# Patient Record
Sex: FEMALE | Race: WHITE | NOT HISPANIC OR LATINO | Employment: FULL TIME | URBAN - METROPOLITAN AREA
[De-identification: names, ages, dates, MRNs, and addresses within clinical notes are randomized per-mention and may not be internally consistent; named-entity substitution may affect disease eponyms.]

---

## 2015-09-16 LAB — Lab: NEGATIVE

## 2018-03-29 ENCOUNTER — OFFICE VISIT (OUTPATIENT)
Dept: OBSTETRICS AND GYNECOLOGY | Facility: CLINIC | Age: 38
End: 2018-03-29
Payer: COMMERCIAL

## 2018-03-29 VITALS — SYSTOLIC BLOOD PRESSURE: 114 MMHG | DIASTOLIC BLOOD PRESSURE: 72 MMHG | WEIGHT: 117 LBS

## 2018-03-29 DIAGNOSIS — O09.522 ADVANCED MATERNAL AGE IN MULTIGRAVIDA, SECOND TRIMESTER: ICD-10-CM

## 2018-03-29 DIAGNOSIS — N91.2 AMENORRHEA: Primary | ICD-10-CM

## 2018-03-29 DIAGNOSIS — Z34.02 ENCOUNTER FOR SUPERVISION OF NORMAL FIRST PREGNANCY IN SECOND TRIMESTER: ICD-10-CM

## 2018-03-29 DIAGNOSIS — Z31.430 ENCOUNTER OF FEMALE FOR TESTING FOR GENETIC DISEASE CARRIER STATUS FOR PROCREATIVE MANAGEMENT: ICD-10-CM

## 2018-03-29 DIAGNOSIS — Z3A.09 9 WEEKS GESTATION OF PREGNANCY: ICD-10-CM

## 2018-03-29 LAB
AMB NIPT OB CONSOLE ONLY: NEGATIVE
BILIRUBIN, POC: NEGATIVE
BLOOD URINE, POC: NEGATIVE
CLARITY, POC: CLEAR
COLOR, POC: YELLOW
FRAGILE X OB CONSOLE: NEGATIVE
GLUCOSE URINE, POC: NEGATIVE
KETONES, POC: NEGATIVE
LEUKOCYTE EST, POC: NEGATIVE
NITRITE, POC: NEGATIVE
PROTEIN, POC: NEGATIVE
SMA OB CONSOLE: NORMAL

## 2018-03-29 PROCEDURE — 81002 URINALYSIS NONAUTO W/O SCOPE: CPT | Performed by: OBSTETRICS & GYNECOLOGY

## 2018-03-29 PROCEDURE — 76815 OB US LIMITED FETUS(S): CPT | Performed by: OBSTETRICS & GYNECOLOGY

## 2018-03-29 PROCEDURE — 99214 OFFICE O/P EST MOD 30 MIN: CPT | Mod: 25 | Performed by: OBSTETRICS & GYNECOLOGY

## 2018-03-29 RX ORDER — ENOXAPARIN SODIUM 100 MG/ML
30 INJECTION SUBCUTANEOUS
Status: ON HOLD | COMMUNITY
End: 2018-10-02

## 2018-03-29 RX ORDER — ESTRADIOL 0.5 MG/1
0.5 TABLET ORAL DAILY
COMMUNITY
End: 2018-09-17 | Stop reason: ALTCHOICE

## 2018-03-29 RX ORDER — PROGESTERONE 100 MG/1
100 CAPSULE ORAL DAILY
COMMUNITY
End: 2018-09-17 | Stop reason: ALTCHOICE

## 2018-03-30 LAB
B19V IGG SER IA-ACNC: 7.2 INDEX (ref 0–0.8)
B19V IGM SER IA-ACNC: 0.1 INDEX (ref 0–0.8)
LAB CORP COMMENT:: NORMAL
T GONDII IGM SER IA-ACNC: <3 AU/ML (ref 0–7.9)

## 2018-04-02 LAB
HGB A MFR BLD: 98.4 % (ref 96.4–98.8)
HGB A2 MFR BLD COLUMN CHROM: 1.6 % (ref 1.8–3.2)
HGB C MFR BLD: 0 %
HGB F MFR BLD: 0 % (ref 0–2)
HGB FRACT BLD-IMP: (no result)
HGB S BLD QL SOLY: NEGATIVE
HGB S MFR BLD: 0 %

## 2018-04-03 NOTE — PROGRESS NOTES
Tatum Correa is a  37 y.o.  with amenorrhea and subjective feelings of pregnancy       Patient's last menstrual period was 2017.  and is currently 10weeks 4 days gestation with  EDC 18    Occupation:    : ger    Medical History/GYN History No past medical history on file.    Surgical History: No past surgical history on file.    Medicines:  Current Outpatient Prescriptions:   •  enoxaparin (LOVENOX) 30 mg/0.3 mL syringe, Inject 30 mg under the skin every 12 (twelve) hours., Disp: , Rfl:   •  estradiol (ESTRACE) 0.5 mg tablet, Take 0.5 mg by mouth daily., Disp: , Rfl:   •  progesterone (PROMETRIUM) 100 mg capsule, Take 100 mg by mouth daily., Disp: , Rfl:     Allergies: Patient has no allergy information on record.  pmh-none  Past sx hx ivf  Last exam: <1 year    Family History for Thrombosis:none    Smoker:none    Cervical Procedure:none    Herpes History:none    OB History:   Obstetric History       T0      L0     SAB0   TAB0   Ectopic0   Multiple0   Live Births0       # Outcome Date GA Lbr Clifton/2nd Weight Sex Delivery Anes PTL Lv   1 Current                 Fertility hx-   2016 laura 40 week delivery 5 lbs 13 oz vaginal  Present pregnancy ivf dr peters 4 cylces on metformain predisone estradiol progesterone nupagen and lovanox    Family History: No family history on file.      Review of Systems  Fatigue neg  Weight Change neg  Cough neg  Chest Pain neg  SOB neg  Abd pain neg  Breast problems neg  Abnormal bleeding neg  Vaginal D/C neg  Pelvic Pain neg  All other systems neg      Assessment: 37 y.o.  with Amenorrhea at 6 weeks  and IUP with Estimated Date of Delivery:10/26/2018        1) Pregnancy:   Pap 17 negative pap negative probe  PNL, urine culture, TFTs, PE, Hgb electophoresis  Aneuploidy screening and testing options reviewed and patient referred to PTC for testing and counseling.    Exercise and Nutrition counseling in pregnancy, safe foods, weight  gain and nutrition reviewed  Routine prenatal care and counseling provided  Major Morbidity and Mortality of age group d/w pt  Safe medications in pregnancy information given  Travel and Zika precautions reviewed  Safety of hair dye, manicure/pedicure and massage reviewed  Prenatal Vitamins with DHA  OB reg at next visit

## 2018-04-04 LAB
# FETUSES US: 1
CYTOGENETICS STUDY: NORMAL
FMR1 GENE CGG RPT BLD/T QL: NORMAL
FMR1 GENE MUT ANL BLD/T: NORMAL
GA: 10 WEEKS
GENETIC ALGORITHM SENSITIVITY: NORMAL %
LAB CORP CHROMOSOME 13 INTERPRETATION: NORMAL
LAB CORP CHROMOSOME 13 RESULT: NORMAL
LAB CORP CHROMOSOME 18 INTERPRETATION: NORMAL
LAB CORP CHROMOSOME 18 RESULT: NORMAL
LAB CORP CHROMOSOME 21 INTERPRETATION: NORMAL
LAB CORP CHROMOSOME 21 RESULT: NORMAL
LAB CORP COMMENT:: NORMAL
LAB CORP DISCLAIMER: NORMAL
LAB CORP FETAL FRACTION (%):: 10
LAB CORP INDICATION FOR TESTING: NORMAL
LAB CORP REFERENCES: NORMAL
LAB CORP SCREEN RESULT: NORMAL
LAB DIRECTOR NAME PROVIDER: NORMAL
REF LAB TEST METHOD: NORMAL
SERVICE CMNT 02-IMP: NORMAL
SERVICE CMNT-IMP: NORMAL

## 2018-04-10 LAB
ANNOTATION COMMENT IMP: NORMAL
ANNOTATION COMMENT IMP: NORMAL
ETHNIC BACKGROUND STATED: NORMAL
GEN KNWLDGE REF ID: NORMAL
LAB CORP CARRIER DETECTION RATE:: NORMAL
LAB CORP CLINICAL DATA:: NORMAL
LAB CORP ELECTRONICALLY SIGNED BY:: NORMAL
LAB CORP GENETIC COUNSELOR:: NORMAL
REF LAB TEST METHOD: NORMAL
SMN1 GENE MUT ANL BLD/T: NORMAL
SMN1 GENE MUT ANL BLD/T: NORMAL
SPEC CONTAINER SPEC: NORMAL
SPECIMEN SOURCE: NORMAL
TEST PERFORMANCE INFO SPEC: NORMAL

## 2018-04-16 ENCOUNTER — OFFICE VISIT (OUTPATIENT)
Dept: OBSTETRICS AND GYNECOLOGY | Facility: CLINIC | Age: 38
End: 2018-04-16
Payer: COMMERCIAL

## 2018-04-16 VITALS — SYSTOLIC BLOOD PRESSURE: 102 MMHG | WEIGHT: 121 LBS | DIASTOLIC BLOOD PRESSURE: 50 MMHG

## 2018-04-16 DIAGNOSIS — Z3A.12 12 WEEKS GESTATION OF PREGNANCY: Primary | ICD-10-CM

## 2018-04-16 LAB
BLOOD URINE, POC: NEGATIVE
GLUCOSE URINE, POC: NEGATIVE
PROTEIN, POC: NEGATIVE

## 2018-04-16 PROCEDURE — 76801 OB US < 14 WKS SINGLE FETUS: CPT | Performed by: OBSTETRICS & GYNECOLOGY

## 2018-04-16 PROCEDURE — 0500F INITIAL PRENATAL CARE VISIT: CPT | Performed by: OBSTETRICS & GYNECOLOGY

## 2018-04-16 PROCEDURE — 81002 URINALYSIS NONAUTO W/O SCOPE: CPT | Performed by: OBSTETRICS & GYNECOLOGY

## 2018-04-16 PROCEDURE — 200200 PR NO CHARGE: Performed by: OBSTETRICS & GYNECOLOGY

## 2018-04-16 RX ORDER — FOLIC ACID 1 MG/1
1 TABLET ORAL DAILY
COMMUNITY
End: 2018-10-27 | Stop reason: HOSPADM

## 2018-04-16 NOTE — PRENATAL NOTE
Nuchal fold <3 mm    Fetal limb development good  Fetal length 6 cm  msafp next visit     Off all hormones  ivf referral dr peters    Occupation:    : ger  Medicines: pnv  Allergies: Patient has no allergy information on record.  pmh-none  Past sx hx ivf  Last exam: <1 year  Family History for Thrombosis:none  Smoker:none  Cervical Procedure:none  Herpes History:none  OB History:   Fertility hx-   2016 laura 40 week delivery 5 lbs 13 oz vaginal  Present pregnancy ivf dr peters 4 cylces on metformain predisone estradiol progesterone nupagen and lovanox

## 2018-05-03 ENCOUNTER — OFFICE VISIT (OUTPATIENT)
Dept: OBSTETRICS AND GYNECOLOGY | Facility: CLINIC | Age: 38
End: 2018-05-03
Payer: COMMERCIAL

## 2018-05-03 VITALS — SYSTOLIC BLOOD PRESSURE: 100 MMHG | WEIGHT: 120 LBS | DIASTOLIC BLOOD PRESSURE: 68 MMHG

## 2018-05-03 DIAGNOSIS — Z3A.14 14 WEEKS GESTATION OF PREGNANCY: Primary | ICD-10-CM

## 2018-05-03 DIAGNOSIS — O35.9XX0 SUSPECTED FETAL ANOMALY, ANTEPARTUM, SINGLE OR UNSPECIFIED FETUS: ICD-10-CM

## 2018-05-03 DIAGNOSIS — Z36.0 ANTENATAL SCREENING FOR CHROMOSOMAL ANOMALIES BY AMNIOCENTESIS: ICD-10-CM

## 2018-05-03 DIAGNOSIS — O09.522 ELDERLY MULTIGRAVIDA IN SECOND TRIMESTER: ICD-10-CM

## 2018-05-03 LAB
BILIRUBIN, POC: NEGATIVE
BLOOD URINE, POC: NEGATIVE
CLARITY, POC: CLEAR
COLOR, POC: YELLOW
GLUCOSE URINE, POC: NEGATIVE
KETONES, POC: NEGATIVE
LEUKOCYTE EST, POC: NEGATIVE
NITRITE, POC: NEGATIVE
PROTEIN, POC: NEGATIVE

## 2018-05-03 PROCEDURE — 76801 OB US < 14 WKS SINGLE FETUS: CPT | Performed by: OBSTETRICS & GYNECOLOGY

## 2018-05-03 PROCEDURE — 200200 PR NO CHARGE: Performed by: OBSTETRICS & GYNECOLOGY

## 2018-05-03 PROCEDURE — 81002 URINALYSIS NONAUTO W/O SCOPE: CPT | Performed by: OBSTETRICS & GYNECOLOGY

## 2018-05-03 PROCEDURE — 0502F SUBSEQUENT PRENATAL CARE: CPT | Performed by: OBSTETRICS & GYNECOLOGY

## 2018-05-03 NOTE — PRENATAL NOTE
PROBLEM LIST-  WITH PRIOR PREGNANCY PT HAD PREECLAMPSIA DX AT 40 WEEKS, ATTEMPTED INDUCTION FAILED, BROUGHT BACK HAD A SUCCESSFUL  PLAN START BABY ASA  BABY HAD A SMALL FOR GESTATIONAL AGE LESS THEN 10TH PERCENTILE,PLAN WILL NEED SCAN AT 32 36 WEEKS  LOW PLATELETS PLAN- CHECK AT 28 WEEKS AND 34 WEEKS   IVF DR FONTENOT

## 2018-05-09 LAB
ABO GROUP BLD: NORMAL
BLD GP AB SCN SERPL QL: NEGATIVE
ERYTHROCYTE [DISTWIDTH] IN BLOOD BY AUTOMATED COUNT: 12.6 % (ref 12.3–15.4)
HBV SURFACE AG SERPL QL IA: NEGATIVE
HCT VFR BLD AUTO: 38.6 % (ref 34–46.6)
HCV AB S/CO SERPL IA: <0.1 S/CO RATIO (ref 0–0.9)
HGB BLD-MCNC: 13.3 G/DL (ref 11.1–15.9)
HIV 1+2 AB+HIV1 P24 AG SERPL QL IA: NON REACTIVE
HIV1 AB SERPLBLD QL IA.RAPID: NEGATIVE
LAB CORP FINAL INTERPRETATION: NEGATIVE
LAB CORP HIV 2 AB: NEGATIVE
MCH RBC QN AUTO: 32.8 PG (ref 26.6–33)
MCHC RBC AUTO-ENTMCNC: 34.5 G/DL (ref 31.5–35.7)
MCV RBC AUTO: 95 FL (ref 79–97)
PLATELET # BLD AUTO: 196 X10E3/UL (ref 150–379)
RBC # BLD AUTO: 4.06 X10E6/UL (ref 3.77–5.28)
RH BLD: POSITIVE
RPR SER QL: NON REACTIVE
RUBV IGG SERPL IA-ACNC: 2.25 INDEX
VZV IGG SER IA-ACNC: 1030 INDEX
WBC # BLD AUTO: 10.6 X10E3/UL (ref 3.4–10.8)

## 2018-05-10 LAB
HGB A MFR BLD: 98.4 % (ref 96.4–98.8)
HGB A2 MFR BLD COLUMN CHROM: 1.6 % (ref 1.8–3.2)
HGB C MFR BLD: 0 %
HGB F MFR BLD: 0 % (ref 0–2)
HGB FRACT BLD-IMP: (no result)
HGB OTHER MFR BLD HPLC: 0 %
HGB S BLD QL SOLY: NEGATIVE
HGB S MFR BLD: 0 %

## 2018-05-11 ENCOUNTER — TELEPHONE (OUTPATIENT)
Dept: OBSTETRICS AND GYNECOLOGY | Facility: CLINIC | Age: 38
End: 2018-05-11

## 2018-05-11 LAB
AFP ADJ MOM SERPL: 0.39
AFP INTERP SERPL-IMP: NORMAL
AFP INTERP SERPL-IMP: NORMAL
AFP SERPL-MCNC: 27.3 NG/ML
AGE AT DELIVERY: 38.1 YR
GA METHOD: NORMAL
GA: 20.6 WEEKS
IDDM PATIENT QL: NO
LAB CORP COMMENT:: NORMAL
LAB CORP RESULTS: NORMAL
MULTIPLE PREGNANCY: NO
NEURAL TUBE DEFECT RISK FETUS: NORMAL %

## 2018-05-21 ENCOUNTER — OFFICE VISIT (OUTPATIENT)
Dept: OBSTETRICS AND GYNECOLOGY | Facility: CLINIC | Age: 38
End: 2018-05-21
Payer: COMMERCIAL

## 2018-05-21 VITALS — DIASTOLIC BLOOD PRESSURE: 60 MMHG | SYSTOLIC BLOOD PRESSURE: 104 MMHG | WEIGHT: 124 LBS

## 2018-05-21 DIAGNOSIS — Z3A.17 17 WEEKS GESTATION OF PREGNANCY: Primary | ICD-10-CM

## 2018-05-21 PROCEDURE — 81002 URINALYSIS NONAUTO W/O SCOPE: CPT | Performed by: OBSTETRICS & GYNECOLOGY

## 2018-05-21 PROCEDURE — 0502F SUBSEQUENT PRENATAL CARE: CPT | Performed by: OBSTETRICS & GYNECOLOGY

## 2018-06-12 ENCOUNTER — HOSPITAL ENCOUNTER (OUTPATIENT)
Dept: PERINATAL CARE | Facility: HOSPITAL | Age: 38
Discharge: HOME | End: 2018-06-12
Attending: OBSTETRICS & GYNECOLOGY
Payer: COMMERCIAL

## 2018-06-12 DIAGNOSIS — O35.9XX0 SUSPECTED FETAL ANOMALY, ANTEPARTUM, SINGLE OR UNSPECIFIED FETUS: ICD-10-CM

## 2018-06-12 DIAGNOSIS — O09.02 SUPERVISION OF PREGNANCY WITH HISTORY OF INFERTILITY IN SECOND TRIMESTER: Primary | ICD-10-CM

## 2018-06-12 DIAGNOSIS — Z3A.20 20 WEEKS GESTATION OF PREGNANCY: ICD-10-CM

## 2018-06-12 DIAGNOSIS — O09.522 ELDERLY MULTIGRAVIDA IN SECOND TRIMESTER: ICD-10-CM

## 2018-06-12 DIAGNOSIS — Z36.3 ANTENATAL SCREENING FOR MALFORMATION USING ULTRASONICS: ICD-10-CM

## 2018-06-12 PROCEDURE — 76811 OB US DETAILED SNGL FETUS: CPT

## 2018-06-18 ENCOUNTER — OFFICE VISIT (OUTPATIENT)
Dept: OBSTETRICS AND GYNECOLOGY | Facility: CLINIC | Age: 38
End: 2018-06-18
Payer: COMMERCIAL

## 2018-06-18 VITALS — SYSTOLIC BLOOD PRESSURE: 100 MMHG | WEIGHT: 127 LBS | DIASTOLIC BLOOD PRESSURE: 70 MMHG

## 2018-06-18 DIAGNOSIS — Z3A.21 21 WEEKS GESTATION OF PREGNANCY: Primary | ICD-10-CM

## 2018-06-18 PROCEDURE — 0502F SUBSEQUENT PRENATAL CARE: CPT | Performed by: OBSTETRICS & GYNECOLOGY

## 2018-06-18 PROCEDURE — 81002 URINALYSIS NONAUTO W/O SCOPE: CPT | Performed by: OBSTETRICS & GYNECOLOGY

## 2018-07-11 ENCOUNTER — OFFICE VISIT (OUTPATIENT)
Dept: OBSTETRICS AND GYNECOLOGY | Facility: CLINIC | Age: 38
End: 2018-07-11
Payer: COMMERCIAL

## 2018-07-11 VITALS — SYSTOLIC BLOOD PRESSURE: 100 MMHG | DIASTOLIC BLOOD PRESSURE: 76 MMHG | WEIGHT: 129 LBS

## 2018-07-11 DIAGNOSIS — Z3A.24 24 WEEKS GESTATION OF PREGNANCY: Primary | ICD-10-CM

## 2018-07-11 LAB
BLOOD URINE, POC: NEGATIVE
CLARITY, POC: CLEAR
COLOR, POC: YELLOW
GLUCOSE URINE, POC: NEGATIVE
PROTEIN, POC: NEGATIVE

## 2018-07-11 PROCEDURE — 0502F SUBSEQUENT PRENATAL CARE: CPT | Performed by: OBSTETRICS & GYNECOLOGY

## 2018-07-11 PROCEDURE — 81002 URINALYSIS NONAUTO W/O SCOPE: CPT | Performed by: OBSTETRICS & GYNECOLOGY

## 2018-07-20 ENCOUNTER — TELEPHONE (OUTPATIENT)
Dept: OBSTETRICS AND GYNECOLOGY | Facility: CLINIC | Age: 38
End: 2018-07-20

## 2018-07-20 NOTE — TELEPHONE ENCOUNTER
"PATIENT CALLED AND WOULD LIKE TO SPEAK WITH DR IZAGUIRRE REGARDING A QUESTION SHE HAS. THERE IS AN AREA IN WHICH SHE WOULD LIKE TO TRAVEL THAT HAS BEEN \"FLAGGED\" WITH THE ZIKA VIRUS, BUT THERE ARE NO KNOWN CASES. SHE IS 26 WEEKS PREGNANT. THANKS.   "

## 2018-07-26 ENCOUNTER — OFFICE VISIT (OUTPATIENT)
Dept: OBSTETRICS AND GYNECOLOGY | Facility: CLINIC | Age: 38
End: 2018-07-26
Payer: COMMERCIAL

## 2018-07-26 VITALS — SYSTOLIC BLOOD PRESSURE: 98 MMHG | WEIGHT: 130 LBS | DIASTOLIC BLOOD PRESSURE: 68 MMHG

## 2018-07-26 DIAGNOSIS — Z3A.26 26 WEEKS GESTATION OF PREGNANCY: Primary | ICD-10-CM

## 2018-07-26 PROCEDURE — 90471 IMMUNIZATION ADMIN: CPT | Performed by: OBSTETRICS & GYNECOLOGY

## 2018-07-26 PROCEDURE — 90715 TDAP VACCINE 7 YRS/> IM: CPT | Performed by: OBSTETRICS & GYNECOLOGY

## 2018-07-26 PROCEDURE — 76815 OB US LIMITED FETUS(S): CPT | Performed by: OBSTETRICS & GYNECOLOGY

## 2018-07-26 PROCEDURE — 0502F SUBSEQUENT PRENATAL CARE: CPT | Performed by: OBSTETRICS & GYNECOLOGY

## 2018-07-26 PROCEDURE — 36415 COLL VENOUS BLD VENIPUNCTURE: CPT | Performed by: OBSTETRICS & GYNECOLOGY

## 2018-07-26 PROCEDURE — 81002 URINALYSIS NONAUTO W/O SCOPE: CPT | Performed by: OBSTETRICS & GYNECOLOGY

## 2018-07-27 LAB
BASOPHILS # BLD AUTO: 0 X10E3/UL (ref 0–0.2)
BASOPHILS NFR BLD AUTO: 0 %
EOSINOPHIL # BLD AUTO: 0.1 X10E3/UL (ref 0–0.4)
EOSINOPHIL NFR BLD AUTO: 1 %
ERYTHROCYTE [DISTWIDTH] IN BLOOD BY AUTOMATED COUNT: 13.6 % (ref 12.3–15.4)
GLUCOSE 1H P 50 G GLC PO SERPL-MCNC: 127 MG/DL (ref 65–139)
HCT VFR BLD AUTO: 38.2 % (ref 34–46.6)
HGB BLD-MCNC: 12.9 G/DL (ref 11.1–15.9)
IMM GRANULOCYTES # BLD: 0 X10E3/UL (ref 0–0.1)
IMM GRANULOCYTES NFR BLD: 0 %
LYMPHOCYTES # BLD AUTO: 2.1 X10E3/UL (ref 0.7–3.1)
LYMPHOCYTES NFR BLD AUTO: 20 %
MCH RBC QN AUTO: 32 PG (ref 26.6–33)
MCHC RBC AUTO-ENTMCNC: 33.8 G/DL (ref 31.5–35.7)
MCV RBC AUTO: 95 FL (ref 79–97)
MONOCYTES # BLD AUTO: 0.2 X10E3/UL (ref 0.1–0.9)
MONOCYTES NFR BLD AUTO: 2 %
NEUTROPHILS # BLD AUTO: 7.8 X10E3/UL (ref 1.4–7)
NEUTROPHILS NFR BLD AUTO: 77 %
PLATELET # BLD AUTO: 139 X10E3/UL (ref 150–379)
RBC # BLD AUTO: 4.03 X10E6/UL (ref 3.77–5.28)
WBC # BLD AUTO: 10.2 X10E3/UL (ref 3.4–10.8)

## 2018-08-16 ENCOUNTER — OFFICE VISIT (OUTPATIENT)
Dept: OBSTETRICS AND GYNECOLOGY | Facility: CLINIC | Age: 38
End: 2018-08-16
Payer: COMMERCIAL

## 2018-08-16 VITALS — SYSTOLIC BLOOD PRESSURE: 92 MMHG | WEIGHT: 129 LBS | DIASTOLIC BLOOD PRESSURE: 54 MMHG

## 2018-08-16 DIAGNOSIS — Z3A.29 29 WEEKS GESTATION OF PREGNANCY: Primary | ICD-10-CM

## 2018-08-16 PROCEDURE — 0502F SUBSEQUENT PRENATAL CARE: CPT | Performed by: OBSTETRICS & GYNECOLOGY

## 2018-08-16 PROCEDURE — 81002 URINALYSIS NONAUTO W/O SCOPE: CPT | Performed by: OBSTETRICS & GYNECOLOGY

## 2018-08-16 NOTE — PRENATAL NOTE
preesure check cervix  Discharge  15 lbs total encourage calories  32 36 PLT IN June 196  CHECK PLATLETS 32 WEEKS 36 WEEKS THEN WEEKLY

## 2018-08-30 ENCOUNTER — OFFICE VISIT (OUTPATIENT)
Dept: OBSTETRICS AND GYNECOLOGY | Facility: CLINIC | Age: 38
End: 2018-08-30
Payer: COMMERCIAL

## 2018-08-30 VITALS — DIASTOLIC BLOOD PRESSURE: 62 MMHG | WEIGHT: 131 LBS | SYSTOLIC BLOOD PRESSURE: 98 MMHG

## 2018-08-30 DIAGNOSIS — Z23 NEEDS FLU SHOT: ICD-10-CM

## 2018-08-30 DIAGNOSIS — O14.23 HEMOLYSIS, ELEVATED LIVER ENZYMES, AND LOW PLATELET (HELLP) SYNDROME DURING PREGNANCY IN THIRD TRIMESTER: ICD-10-CM

## 2018-08-30 DIAGNOSIS — Z3A.31 31 WEEKS GESTATION OF PREGNANCY: Primary | ICD-10-CM

## 2018-08-30 PROCEDURE — 90471 IMMUNIZATION ADMIN: CPT | Performed by: OBSTETRICS & GYNECOLOGY

## 2018-08-30 PROCEDURE — 0502F SUBSEQUENT PRENATAL CARE: CPT | Performed by: OBSTETRICS & GYNECOLOGY

## 2018-08-30 PROCEDURE — 90686 IIV4 VACC NO PRSV 0.5 ML IM: CPT | Performed by: OBSTETRICS & GYNECOLOGY

## 2018-08-30 PROCEDURE — 76815 OB US LIMITED FETUS(S): CPT | Performed by: OBSTETRICS & GYNECOLOGY

## 2018-08-30 PROCEDURE — 81002 URINALYSIS NONAUTO W/O SCOPE: CPT | Performed by: OBSTETRICS & GYNECOLOGY

## 2018-08-31 ENCOUNTER — TELEPHONE (OUTPATIENT)
Dept: OBSTETRICS AND GYNECOLOGY | Facility: CLINIC | Age: 38
End: 2018-08-31

## 2018-08-31 LAB
BASOPHILS # BLD AUTO: 0 X10E3/UL (ref 0–0.2)
BASOPHILS NFR BLD AUTO: 0 %
EOSINOPHIL # BLD AUTO: 0.1 X10E3/UL (ref 0–0.4)
EOSINOPHIL NFR BLD AUTO: 1 %
ERYTHROCYTE [DISTWIDTH] IN BLOOD BY AUTOMATED COUNT: 13.5 % (ref 12.3–15.4)
HCT VFR BLD AUTO: 37.8 % (ref 34–46.6)
HGB BLD-MCNC: 12.8 G/DL (ref 11.1–15.9)
IMM GRANULOCYTES # BLD: 0 X10E3/UL (ref 0–0.1)
IMM GRANULOCYTES NFR BLD: 0 %
LYMPHOCYTES # BLD AUTO: 1.7 X10E3/UL (ref 0.7–3.1)
LYMPHOCYTES NFR BLD AUTO: 18 %
MCH RBC QN AUTO: 31.7 PG (ref 26.6–33)
MCHC RBC AUTO-ENTMCNC: 33.9 G/DL (ref 31.5–35.7)
MCV RBC AUTO: 94 FL (ref 79–97)
MONOCYTES # BLD AUTO: 0.4 X10E3/UL (ref 0.1–0.9)
MONOCYTES NFR BLD AUTO: 4 %
NEUTROPHILS # BLD AUTO: 7.2 X10E3/UL (ref 1.4–7)
NEUTROPHILS NFR BLD AUTO: 77 %
PLATELET # BLD AUTO: 129 X10E3/UL (ref 150–379)
RBC # BLD AUTO: 4.04 X10E6/UL (ref 3.77–5.28)
WBC # BLD AUTO: 9.4 X10E3/UL (ref 3.4–10.8)

## 2018-08-31 NOTE — TELEPHONE ENCOUNTER
Patient being followed for history of low platelets CBC obtained yesterday 129,000 prior was 139,000 plan for repeat every 2 weeks

## 2018-09-11 ENCOUNTER — OFFICE VISIT (OUTPATIENT)
Dept: OBSTETRICS AND GYNECOLOGY | Facility: CLINIC | Age: 38
End: 2018-09-11
Payer: COMMERCIAL

## 2018-09-11 VITALS — DIASTOLIC BLOOD PRESSURE: 70 MMHG | SYSTOLIC BLOOD PRESSURE: 100 MMHG | WEIGHT: 131 LBS

## 2018-09-11 DIAGNOSIS — Z3A.33 33 WEEKS GESTATION OF PREGNANCY: ICD-10-CM

## 2018-09-11 DIAGNOSIS — Z34.83 PRENATAL CARE, SUBSEQUENT PREGNANCY, THIRD TRIMESTER: Primary | ICD-10-CM

## 2018-09-11 PROCEDURE — 81002 URINALYSIS NONAUTO W/O SCOPE: CPT | Performed by: OBSTETRICS & GYNECOLOGY

## 2018-09-11 PROCEDURE — 36415 COLL VENOUS BLD VENIPUNCTURE: CPT | Performed by: OBSTETRICS & GYNECOLOGY

## 2018-09-11 PROCEDURE — 0502F SUBSEQUENT PRENATAL CARE: CPT | Performed by: OBSTETRICS & GYNECOLOGY

## 2018-09-12 LAB
BASOPHILS # BLD AUTO: 0 X10E3/UL (ref 0–0.2)
BASOPHILS NFR BLD AUTO: 0 %
EOSINOPHIL # BLD AUTO: 0.1 X10E3/UL (ref 0–0.4)
EOSINOPHIL NFR BLD AUTO: 1 %
ERYTHROCYTE [DISTWIDTH] IN BLOOD BY AUTOMATED COUNT: 13.7 % (ref 12.3–15.4)
HCT VFR BLD AUTO: 39.2 % (ref 34–46.6)
HGB BLD-MCNC: 13.5 G/DL (ref 11.1–15.9)
IMM GRANULOCYTES # BLD: 0 X10E3/UL (ref 0–0.1)
IMM GRANULOCYTES NFR BLD: 0 %
LYMPHOCYTES # BLD AUTO: 1.7 X10E3/UL (ref 0.7–3.1)
LYMPHOCYTES NFR BLD AUTO: 16 %
MCH RBC QN AUTO: 33.3 PG (ref 26.6–33)
MCHC RBC AUTO-ENTMCNC: 34.4 G/DL (ref 31.5–35.7)
MCV RBC AUTO: 97 FL (ref 79–97)
MONOCYTES # BLD AUTO: 0.3 X10E3/UL (ref 0.1–0.9)
MONOCYTES NFR BLD AUTO: 3 %
NEUTROPHILS # BLD AUTO: 8.4 X10E3/UL (ref 1.4–7)
NEUTROPHILS NFR BLD AUTO: 80 %
PLATELET # BLD AUTO: 125 X10E3/UL (ref 150–379)
RBC # BLD AUTO: 4.05 X10E6/UL (ref 3.77–5.28)
WBC # BLD AUTO: 10.6 X10E3/UL (ref 3.4–10.8)

## 2018-09-13 ENCOUNTER — TELEPHONE (OUTPATIENT)
Dept: OBSTETRICS AND GYNECOLOGY | Facility: CLINIC | Age: 38
End: 2018-09-13

## 2018-09-17 PROBLEM — Z34.83 PRENATAL CARE, SUBSEQUENT PREGNANCY, THIRD TRIMESTER: Status: ACTIVE | Noted: 2018-09-17

## 2018-09-27 ENCOUNTER — OFFICE VISIT (OUTPATIENT)
Dept: OBSTETRICS AND GYNECOLOGY | Facility: CLINIC | Age: 38
End: 2018-09-27
Payer: COMMERCIAL

## 2018-09-27 VITALS — DIASTOLIC BLOOD PRESSURE: 56 MMHG | WEIGHT: 131 LBS | SYSTOLIC BLOOD PRESSURE: 106 MMHG

## 2018-09-27 DIAGNOSIS — D69.6 THROMBOCYTOPENIA AFFECTING PREGNANCY (CMS/HCC): ICD-10-CM

## 2018-09-27 DIAGNOSIS — O99.119 THROMBOCYTOPENIA AFFECTING PREGNANCY (CMS/HCC): ICD-10-CM

## 2018-09-27 DIAGNOSIS — Z3A.35 35 WEEKS GESTATION OF PREGNANCY: Primary | ICD-10-CM

## 2018-09-27 DIAGNOSIS — Z34.83 PRENATAL CARE, SUBSEQUENT PREGNANCY, THIRD TRIMESTER: ICD-10-CM

## 2018-09-27 LAB
BLOOD URINE, POC: NEGATIVE
GLUCOSE URINE, POC: NEGATIVE
LEUKOCYTE EST, POC: NEGATIVE
NITRITE, POC: NEGATIVE
PROTEIN, POC: NEGATIVE

## 2018-09-27 PROCEDURE — 76815 OB US LIMITED FETUS(S): CPT | Performed by: OBSTETRICS & GYNECOLOGY

## 2018-09-27 PROCEDURE — 0502F SUBSEQUENT PRENATAL CARE: CPT | Performed by: OBSTETRICS & GYNECOLOGY

## 2018-09-27 PROCEDURE — 81002 URINALYSIS NONAUTO W/O SCOPE: CPT | Performed by: OBSTETRICS & GYNECOLOGY

## 2018-09-27 NOTE — PROGRESS NOTES
Subjective     Patient ID: Tatum Correa is a 38 y.o. female.    HPI    Review of Systems    Objective     Vitals:    09/27/18 1413   BP: (!) 106/56   BP Location: Right upper arm   Patient Position: Sitting   Weight: 59.4 kg (131 lb)     There is no height or weight on file to calculate BMI.    Physical Exam    Assessment/Plan   Problem List Items Addressed This Visit     Prenatal care, subsequent pregnancy, third trimester    Relevant Orders    POCT OB urinalysis dipstick - routine prenatal (Completed)    Group B Strep Culture      Other Visit Diagnoses     35 weeks gestation of pregnancy    -  Primary    Relevant Orders    POCT OB urinalysis dipstick - routine prenatal (Completed)    Group B Strep Culture    CBC    Thrombocytopenia affecting pregnancy (CMS/HCC) ([facility])        Relevant Orders    CBC

## 2018-09-28 ENCOUNTER — TELEPHONE (OUTPATIENT)
Dept: OBSTETRICS AND GYNECOLOGY | Facility: CLINIC | Age: 38
End: 2018-09-28

## 2018-09-28 LAB
ERYTHROCYTE [DISTWIDTH] IN BLOOD BY AUTOMATED COUNT: 13.1 % (ref 12.3–15.4)
HCT VFR BLD AUTO: 40.4 % (ref 34–46.6)
HGB BLD-MCNC: 13.7 G/DL (ref 11.1–15.9)
MCH RBC QN AUTO: 33.1 PG (ref 26.6–33)
MCHC RBC AUTO-ENTMCNC: 33.9 G/DL (ref 31.5–35.7)
MCV RBC AUTO: 98 FL (ref 79–97)
PLATELET # BLD AUTO: 103 X10E3/UL (ref 150–379)
RBC # BLD AUTO: 4.14 X10E6/UL (ref 3.77–5.28)
WBC # BLD AUTO: 11.2 X10E3/UL (ref 3.4–10.8)

## 2018-09-29 LAB — GP B STREP DNA SPEC QL NAA+PROBE: NEGATIVE

## 2018-10-01 ENCOUNTER — TELEPHONE (OUTPATIENT)
Dept: OBSTETRICS AND GYNECOLOGY | Facility: CLINIC | Age: 38
End: 2018-10-01

## 2018-10-02 ENCOUNTER — LAB REQUISITION (OUTPATIENT)
Dept: LAB | Facility: HOSPITAL | Age: 38
End: 2018-10-02
Attending: INTERNAL MEDICINE
Payer: COMMERCIAL

## 2018-10-02 ENCOUNTER — HOSPITAL ENCOUNTER (OUTPATIENT)
Facility: HOSPITAL | Age: 38
Setting detail: OBSERVATION
Discharge: HOME | End: 2018-10-02
Attending: OBSTETRICS & GYNECOLOGY | Admitting: OBSTETRICS & GYNECOLOGY
Payer: COMMERCIAL

## 2018-10-02 VITALS
TEMPERATURE: 98.1 F | HEIGHT: 68 IN | DIASTOLIC BLOOD PRESSURE: 76 MMHG | HEART RATE: 52 BPM | WEIGHT: 135 LBS | SYSTOLIC BLOOD PRESSURE: 109 MMHG | BODY MASS INDEX: 20.46 KG/M2

## 2018-10-02 DIAGNOSIS — O99.119 OTHER DISEASES OF THE BLOOD AND BLOOD-FORMING ORGANS AND CERTAIN DISORDERS INVOLVING THE IMMUNE MECHANISM COMPLICATING PREGNANCY, UNSPECIFIED TRIMESTER: ICD-10-CM

## 2018-10-02 DIAGNOSIS — O26.893 PREGNANCY HEADACHE IN THIRD TRIMESTER: Primary | ICD-10-CM

## 2018-10-02 DIAGNOSIS — R51.9 PREGNANCY HEADACHE IN THIRD TRIMESTER: Primary | ICD-10-CM

## 2018-10-02 PROBLEM — O26.899 HEADACHE IN PREGNANCY: Status: ACTIVE | Noted: 2018-10-02

## 2018-10-02 LAB
ALBUMIN SERPL-MCNC: 2.6 G/DL (ref 3.4–5)
ALP SERPL-CCNC: 108 IU/L (ref 35–126)
ALT SERPL-CCNC: 15 IU/L (ref 11–54)
ANION GAP SERPL CALC-SCNC: 9 MEQ/L (ref 3–15)
AST SERPL-CCNC: 21 IU/L (ref 15–41)
BASOPHILS # BLD: 0.05 K/UL (ref 0.01–0.1)
BASOPHILS NFR BLD: 0.5 %
BILIRUB SERPL-MCNC: 0.4 MG/DL (ref 0.3–1.2)
BUN SERPL-MCNC: 12 MG/DL (ref 8–20)
CALCIUM SERPL-MCNC: 8.6 MG/DL (ref 8.9–10.3)
CHLORIDE SERPL-SCNC: 105 MEQ/L (ref 98–109)
CO2 SERPL-SCNC: 20 MEQ/L (ref 22–32)
CREAT SERPL-MCNC: 0.6 MG/DL (ref 0.6–1.1)
DIFFERENTIAL METHOD BLD: ABNORMAL
EOSINOPHIL # BLD: 0.06 K/UL (ref 0.04–0.36)
EOSINOPHIL NFR BLD: 0.6 %
ERYTHROCYTE [DISTWIDTH] IN BLOOD BY AUTOMATED COUNT: 12.6 % (ref 11.7–14.4)
GFR SERPL CREATININE-BSD FRML MDRD: >60 ML/MIN/1.73M*2
GLUCOSE SERPL-MCNC: 78 MG/DL (ref 70–99)
HCT VFR BLDCO AUTO: 36 % (ref 35–45)
HGB BLD-MCNC: 12.8 G/DL (ref 11.8–15.7)
IMM GRANULOCYTES # BLD AUTO: 0.05 K/UL (ref 0–0.08)
IMM GRANULOCYTES NFR BLD AUTO: 0.5 %
LDH SERPL L TO P-CCNC: 127 IU/L (ref 98–192)
LYMPHOCYTES # BLD: 1.63 K/UL (ref 1.2–3.5)
LYMPHOCYTES NFR BLD: 15.9 %
MCH RBC QN AUTO: 33.2 PG (ref 28–33.2)
MCHC RBC AUTO-ENTMCNC: 35.6 G/DL (ref 32.2–35.5)
MCV RBC AUTO: 93.5 FL (ref 83–98)
MONOCYTES # BLD: 0.47 K/UL (ref 0.28–0.8)
MONOCYTES NFR BLD: 4.6 %
NEUTROPHILS # BLD: 7.98 K/UL (ref 1.7–7)
NEUTS SEG NFR BLD: 77.9 %
NRBC BLD-RTO: 0 %
PDW BLD AUTO: 12.4 FL (ref 9.4–12.3)
PLAT MORPH BLD: NORMAL
PLATELET # BLD AUTO: 105 K/UL (ref 150–369)
PLATELET # BLD EST: ABNORMAL 10*3/UL
POTASSIUM SERPL-SCNC: 3.8 MEQ/L (ref 3.6–5.1)
PROT SERPL-MCNC: 5 G/DL (ref 6–8.2)
RBC # BLD AUTO: 3.85 M/UL (ref 3.93–5.22)
RBC MORPH BLD: NORMAL
SODIUM SERPL-SCNC: 134 MEQ/L (ref 136–144)
WBC # BLD AUTO: 10.24 K/UL (ref 3.8–10.5)

## 2018-10-02 PROCEDURE — 80053 COMPREHEN METABOLIC PANEL: CPT | Performed by: INTERNAL MEDICINE

## 2018-10-02 PROCEDURE — 36415 COLL VENOUS BLD VENIPUNCTURE: CPT | Performed by: INTERNAL MEDICINE

## 2018-10-02 PROCEDURE — 85025 COMPLETE CBC W/AUTO DIFF WBC: CPT | Performed by: INTERNAL MEDICINE

## 2018-10-02 PROCEDURE — G0378 HOSPITAL OBSERVATION PER HR: HCPCS

## 2018-10-02 PROCEDURE — 63700000 HC SELF-ADMINISTRABLE DRUG: Performed by: OBSTETRICS & GYNECOLOGY

## 2018-10-02 PROCEDURE — 83615 LACTATE (LD) (LDH) ENZYME: CPT | Performed by: INTERNAL MEDICINE

## 2018-10-02 RX ORDER — METOCLOPRAMIDE 10 MG/1
10 TABLET ORAL ONCE
Status: COMPLETED | OUTPATIENT
Start: 2018-10-02 | End: 2018-10-02

## 2018-10-02 RX ORDER — DIPHENHYDRAMINE HCL 25 MG
25 CAPSULE ORAL ONCE
Status: COMPLETED | OUTPATIENT
Start: 2018-10-02 | End: 2018-10-02

## 2018-10-02 RX ORDER — ASPIRIN 81 MG/1
81 TABLET ORAL DAILY
COMMUNITY
End: 2018-10-27 | Stop reason: HOSPADM

## 2018-10-02 RX ADMIN — METOCLOPRAMIDE 10 MG: 10 TABLET ORAL at 21:28

## 2018-10-02 RX ADMIN — DIPHENHYDRAMINE HYDROCHLORIDE 25 MG: 25 CAPSULE ORAL at 21:28

## 2018-10-03 NOTE — H&P
HPI     Tatum Correa is a 38 y.o. female  at 36w4d by LMP consistent with 1st trimester in office ultrasound presents with headache of two days, not improved with Tylenol taken around 7pm tonight. Denies vision changes, RUQ pain, chest pain, shortness of breath. Reports taking her blood pressure with home automatic BP cuff tonight and it was 150/100.     Reports history of gestational HTN in past pregnancy. Reports gestational thrombocytopenia in this pregnancy. Labs performed today at hematology visit and platelets were 105 from 103 on 2018. Otherwise gestational HTN labs within normal limits.     Reports occasional contractions that don't feel like labor. Denies vaginal bleeding and leaking of fluid. Endorses good fetal movement.     OB History:   Obstetric History       T1      L1     SAB0   TAB0   Ectopic0   Multiple0   Live Births1       # Outcome Date GA Lbr Clif[place]/2nd Weight Sex Delivery Anes PTL Lv   2 Current            1 Term    2.637 kg F Vag-Spont EPI  BJ      Complications: Gestation Hypertension (GHTN)          Medical History:   Past Medical History:   Diagnosis Date   • Gestational thrombocytopenia (CMS/HCC) (HCC)    • Gestational thrombocytopenia (CMS/HCC) (HCC)        Surgical History:   Past Surgical History:   Procedure Laterality Date   • NASAL SINUS SURGERY     • WISDOM TOOTH EXTRACTION         Social History:   Social History     Social History   • Marital status:      Spouse name: Shakeel   • Number of children: N/A   • Years of education: N/A     Social History Main Topics   • Smoking status: Never Smoker   • Smokeless tobacco: Never Used   • Alcohol use No   • Drug use: No   • Sexual activity: Yes     Partners: Male     Birth control/ protection: None     Other Topics Concern   • None     Social History Narrative   • None        Family History:   Family History   Problem Relation Age of Onset   • Prostate cancer Father        Allergies: Patient has no known  allergies.    Prior to Admission medications    Medication Sig Start Date End Date Taking? Authorizing Provider   aspirin 81 mg enteric coated tablet Take 81 mg by mouth daily.   Yes Connie Durbin MD   folic acid (FOLVITE) 1 mg tablet Take 1 mg by mouth daily.   Yes Connie Durbin MD   PRENATAL VITS96/IRON FUM/FOLIC (PRE-SELMA VITAMIN) 27 mg iron- 800 mcg tablet Take by mouth daily.   Yes Connie Durbin MD   enoxaparin (LOVENOX) 30 mg/0.3 mL syringe Inject 30 mg under the skin every 12 (twelve) hours.  10/2/18  Connie Durbin MD       Review of Systems  Pertinent items are noted in HPI.    Objective     Vital Signs for the last 24 hours:  Temp:  [36.7 °C (98.1 °F)] 36.7 °C (98.1 °F)  Heart Rate:  [55-57] 55  BP: (106-111)/(64-72) 106/64    Latest cervical exam:  Deferred    Fetal Monitoring:  FHR Baseline: 130  FHR Variability: moderate   FHR Accelerations: +   FHR Decelerations: none     Contraction Frequency: irregular, every 4-10 minutes on TOCO    Exam:  General Appearance: Alert, cooperative, no acute distress  Lungs: Clear to auscultation bilaterally, respirations unlabored  Heart: Regular rate and rhythm, S1 and S2 normal, no murmur, rub or gallop  Abdomen: gravid, nontender  Genitalia: deferred  Extremities: no edema or calf tenderness  Neurologic: grossly intact without focal deficits. +1 brachioradialis and +2 patellar reflexes bilaterally     Ultrasounds:   I have reviewed the applicable Ultrasounds.  Normal anatomy ultrasound,  anterior placenta.     Labs:    Results from last 7 days  Lab Units 10/02/18  1115 18  1415   WBC K/uL 10.24 11.2*   HEMOGLOBIN g/dL 12.8 13.7   HEMATOCRIT % 36.0 40.4   PLATELETS K/uL 105* 103*       Results from last 7 days  Lab Units 10/02/18  1115   SODIUM mEQ/L 134*   POTASSIUM mEQ/L 3.8   CHLORIDE mEQ/L 105   CO2 mEQ/L 20*   BUN mg/dL 12   CREATININE mg/dL 0.6   CALCIUM mg/dL 8.6*   ALBUMIN g/dL 2.6*   BILIRUBIN TOTAL mg/dL 0.4   ALK PHOS  IU/L 108   ALT IU/L 15   AST IU/L 21   GLUCOSE mg/dL 78         Assessment/Plan     Tatum Correa is a 38 y.o. female  at 36w4d admitted for headache, without signs or symptoms of gestational HTN or preeclampsia.     FHR: Reactive  GBS: negative   Rule out gestational HTN/ preeclampsia: normotensive x3. Labs normal today. Will treat headache with reglan and benadryl. If improves, patient will be stable for discharge. Will continue to monitor Bps for two additional measurements.     Discussed with Dr. Ledesma.     Irina Romero MD

## 2018-10-03 NOTE — PROGRESS NOTES
S: Patient headache feeling substantially improved.     O:   Vitals:    10/02/18 2136   BP: 109/76   Pulse: (!) 52   Temp:      A/P:     Given persistently normotensive blood pressures, improvement in headache, stable platelets, and otherwise normal gestational HTN labs will discharge to home now.

## 2018-10-04 ENCOUNTER — OFFICE VISIT (OUTPATIENT)
Dept: OBSTETRICS AND GYNECOLOGY | Facility: CLINIC | Age: 38
End: 2018-10-04
Payer: COMMERCIAL

## 2018-10-04 VITALS — DIASTOLIC BLOOD PRESSURE: 70 MMHG | WEIGHT: 134 LBS | SYSTOLIC BLOOD PRESSURE: 108 MMHG | BODY MASS INDEX: 20.37 KG/M2

## 2018-10-04 DIAGNOSIS — Z34.03 ENCOUNTER FOR SUPERVISION OF NORMAL FIRST PREGNANCY IN THIRD TRIMESTER: Primary | ICD-10-CM

## 2018-10-04 DIAGNOSIS — Z3A.36 36 WEEKS GESTATION OF PREGNANCY: ICD-10-CM

## 2018-10-04 PROCEDURE — 0502F SUBSEQUENT PRENATAL CARE: CPT | Performed by: OBSTETRICS & GYNECOLOGY

## 2018-10-09 ENCOUNTER — LAB REQUISITION (OUTPATIENT)
Dept: LAB | Facility: HOSPITAL | Age: 38
End: 2018-10-09
Attending: INTERNAL MEDICINE
Payer: COMMERCIAL

## 2018-10-09 DIAGNOSIS — D69.59 OTHER SECONDARY THROMBOCYTOPENIA: ICD-10-CM

## 2018-10-09 LAB
BASOPHILS # BLD: 0.03 K/UL (ref 0.01–0.1)
BASOPHILS NFR BLD: 0.3 %
DIFFERENTIAL METHOD BLD: ABNORMAL
EOSINOPHIL # BLD: 0.06 K/UL (ref 0.04–0.36)
EOSINOPHIL NFR BLD: 0.6 %
ERYTHROCYTE [DISTWIDTH] IN BLOOD BY AUTOMATED COUNT: 12.6 % (ref 11.7–14.4)
HCT VFR BLDCO AUTO: 37.8 % (ref 35–45)
HGB BLD-MCNC: 13.3 G/DL (ref 11.8–15.7)
IMM GRANULOCYTES # BLD AUTO: 0.05 K/UL (ref 0–0.08)
IMM GRANULOCYTES NFR BLD AUTO: 0.5 %
LYMPHOCYTES # BLD: 1.68 K/UL (ref 1.2–3.5)
LYMPHOCYTES NFR BLD: 16.1 %
MCH RBC QN AUTO: 33.4 PG (ref 28–33.2)
MCHC RBC AUTO-ENTMCNC: 35.2 G/DL (ref 32.2–35.5)
MCV RBC AUTO: 95 FL (ref 83–98)
MONOCYTES # BLD: 0.46 K/UL (ref 0.28–0.8)
MONOCYTES NFR BLD: 4.4 %
NEUTROPHILS # BLD: 8.15 K/UL (ref 1.7–7)
NEUTS SEG NFR BLD: 78.1 %
NRBC BLD-RTO: 0 %
PDW BLD AUTO: 12.4 FL (ref 9.4–12.3)
PLATELET # BLD AUTO: 115 K/UL (ref 150–369)
RBC # BLD AUTO: 3.98 M/UL (ref 3.93–5.22)
WBC # BLD AUTO: 10.43 K/UL (ref 3.8–10.5)

## 2018-10-09 PROCEDURE — 36415 COLL VENOUS BLD VENIPUNCTURE: CPT | Performed by: INTERNAL MEDICINE

## 2018-10-09 PROCEDURE — 85025 COMPLETE CBC W/AUTO DIFF WBC: CPT | Performed by: INTERNAL MEDICINE

## 2018-10-11 ENCOUNTER — OFFICE VISIT (OUTPATIENT)
Dept: OBSTETRICS AND GYNECOLOGY | Facility: CLINIC | Age: 38
End: 2018-10-11
Payer: COMMERCIAL

## 2018-10-11 VITALS — DIASTOLIC BLOOD PRESSURE: 70 MMHG | BODY MASS INDEX: 20.53 KG/M2 | SYSTOLIC BLOOD PRESSURE: 108 MMHG | WEIGHT: 135 LBS

## 2018-10-11 DIAGNOSIS — Z34.83 PRENATAL CARE, SUBSEQUENT PREGNANCY, THIRD TRIMESTER: ICD-10-CM

## 2018-10-11 DIAGNOSIS — Z3A.37 PREGNANCY WITH 37 WEEKS COMPLETED GESTATION: Primary | ICD-10-CM

## 2018-10-11 PROCEDURE — 0502F SUBSEQUENT PRENATAL CARE: CPT | Performed by: OBSTETRICS & GYNECOLOGY

## 2018-10-11 PROCEDURE — 81002 URINALYSIS NONAUTO W/O SCOPE: CPT | Performed by: OBSTETRICS & GYNECOLOGY

## 2018-10-16 ENCOUNTER — LAB REQUISITION (OUTPATIENT)
Dept: LAB | Facility: HOSPITAL | Age: 38
End: 2018-10-16
Attending: INTERNAL MEDICINE
Payer: COMMERCIAL

## 2018-10-16 ENCOUNTER — OFFICE VISIT (OUTPATIENT)
Dept: OBSTETRICS AND GYNECOLOGY | Facility: CLINIC | Age: 38
End: 2018-10-16
Payer: COMMERCIAL

## 2018-10-16 VITALS — DIASTOLIC BLOOD PRESSURE: 60 MMHG | WEIGHT: 136 LBS | BODY MASS INDEX: 20.68 KG/M2 | SYSTOLIC BLOOD PRESSURE: 110 MMHG

## 2018-10-16 DIAGNOSIS — D69.59 OTHER SECONDARY THROMBOCYTOPENIA: ICD-10-CM

## 2018-10-16 DIAGNOSIS — Z3A.38 38 WEEKS GESTATION OF PREGNANCY: Primary | ICD-10-CM

## 2018-10-16 DIAGNOSIS — Z34.83 PRENATAL CARE, SUBSEQUENT PREGNANCY, THIRD TRIMESTER: ICD-10-CM

## 2018-10-16 LAB
BASOPHILS # BLD: 0.03 K/UL (ref 0.01–0.1)
BASOPHILS NFR BLD: 0.3 %
BLOOD URINE, POC: NEGATIVE
DIFFERENTIAL METHOD BLD: ABNORMAL
EOSINOPHIL # BLD: 0.09 K/UL (ref 0.04–0.36)
EOSINOPHIL NFR BLD: 1 %
ERYTHROCYTE [DISTWIDTH] IN BLOOD BY AUTOMATED COUNT: 12.6 % (ref 11.7–14.4)
GLUCOSE URINE, POC: NEGATIVE
HCT VFR BLDCO AUTO: 38.1 % (ref 35–45)
HGB BLD-MCNC: 13.2 G/DL (ref 11.8–15.7)
IMM GRANULOCYTES # BLD AUTO: 0.05 K/UL (ref 0–0.08)
IMM GRANULOCYTES NFR BLD AUTO: 0.5 %
LEUKOCYTE EST, POC: NEGATIVE
LYMPHOCYTES # BLD: 1.58 K/UL (ref 1.2–3.5)
LYMPHOCYTES NFR BLD: 16.7 %
MCH RBC QN AUTO: 32.9 PG (ref 28–33.2)
MCHC RBC AUTO-ENTMCNC: 34.6 G/DL (ref 32.2–35.5)
MCV RBC AUTO: 95 FL (ref 83–98)
MONOCYTES # BLD: 0.52 K/UL (ref 0.28–0.8)
MONOCYTES NFR BLD: 5.5 %
NEUTROPHILS # BLD: 7.17 K/UL (ref 1.7–7)
NEUTS SEG NFR BLD: 76 %
NITRITE, POC: NEGATIVE
NRBC BLD-RTO: 0 %
PDW BLD AUTO: 12.2 FL (ref 9.4–12.3)
PLATELET # BLD AUTO: 102 K/UL (ref 150–369)
PROTEIN, POC: NEGATIVE
RBC # BLD AUTO: 4.01 M/UL (ref 3.93–5.22)
WBC # BLD AUTO: 9.44 K/UL (ref 3.8–10.5)

## 2018-10-16 PROCEDURE — 36415 COLL VENOUS BLD VENIPUNCTURE: CPT | Performed by: INTERNAL MEDICINE

## 2018-10-16 PROCEDURE — 0502F SUBSEQUENT PRENATAL CARE: CPT | Performed by: OBSTETRICS & GYNECOLOGY

## 2018-10-16 PROCEDURE — 81002 URINALYSIS NONAUTO W/O SCOPE: CPT | Performed by: OBSTETRICS & GYNECOLOGY

## 2018-10-16 PROCEDURE — 85025 COMPLETE CBC W/AUTO DIFF WBC: CPT | Performed by: INTERNAL MEDICINE

## 2018-10-16 RX ORDER — PREDNISONE 20 MG/1
20 TABLET ORAL DAILY
Qty: 10 TABLET | Refills: 0 | Status: SHIPPED | OUTPATIENT
Start: 2018-10-16 | End: 2018-10-27 | Stop reason: HOSPADM

## 2018-10-19 ENCOUNTER — TELEPHONE (OUTPATIENT)
Dept: OBSTETRICS AND GYNECOLOGY | Facility: CLINIC | Age: 38
End: 2018-10-19

## 2018-10-22 ENCOUNTER — LAB REQUISITION (OUTPATIENT)
Dept: LAB | Facility: HOSPITAL | Age: 38
End: 2018-10-22
Attending: INTERNAL MEDICINE
Payer: COMMERCIAL

## 2018-10-22 ENCOUNTER — OFFICE VISIT (OUTPATIENT)
Dept: OBSTETRICS AND GYNECOLOGY | Facility: CLINIC | Age: 38
End: 2018-10-22
Payer: COMMERCIAL

## 2018-10-22 VITALS — WEIGHT: 134 LBS | SYSTOLIC BLOOD PRESSURE: 110 MMHG | DIASTOLIC BLOOD PRESSURE: 68 MMHG | BODY MASS INDEX: 20.37 KG/M2

## 2018-10-22 DIAGNOSIS — Z3A.38 38 WEEKS GESTATION OF PREGNANCY: Primary | ICD-10-CM

## 2018-10-22 DIAGNOSIS — D69.59 OTHER SECONDARY THROMBOCYTOPENIA: ICD-10-CM

## 2018-10-22 LAB
BASOPHILS # BLD: 0.04 K/UL (ref 0.01–0.1)
BASOPHILS NFR BLD: 0.3 %
BLOOD URINE, POC: NEGATIVE
DIFFERENTIAL METHOD BLD: ABNORMAL
EOSINOPHIL # BLD: 0.03 K/UL (ref 0.04–0.36)
EOSINOPHIL NFR BLD: 0.2 %
ERYTHROCYTE [DISTWIDTH] IN BLOOD BY AUTOMATED COUNT: 12.6 % (ref 11.7–14.4)
GLUCOSE URINE, POC: NEGATIVE
HCT VFR BLDCO AUTO: 38.9 % (ref 35–45)
HGB BLD-MCNC: 13.6 G/DL (ref 11.8–15.7)
IMM GRANULOCYTES # BLD AUTO: 0.1 K/UL (ref 0–0.08)
IMM GRANULOCYTES NFR BLD AUTO: 0.7 %
LEUKOCYTE EST, POC: NEGATIVE
LYMPHOCYTES # BLD: 1.29 K/UL (ref 1.2–3.5)
LYMPHOCYTES NFR BLD: 9.3 %
MCH RBC QN AUTO: 33 PG (ref 28–33.2)
MCHC RBC AUTO-ENTMCNC: 35 G/DL (ref 32.2–35.5)
MCV RBC AUTO: 94.4 FL (ref 83–98)
MONOCYTES # BLD: 0.34 K/UL (ref 0.28–0.8)
MONOCYTES NFR BLD: 2.5 %
NEUTROPHILS # BLD: 12 K/UL (ref 1.7–7)
NEUTS SEG NFR BLD: 87 %
NITRITE, POC: NEGATIVE
NRBC BLD-RTO: 0 %
PDW BLD AUTO: 12.3 FL (ref 9.4–12.3)
PLATELET # BLD AUTO: 117 K/UL (ref 150–369)
PROTEIN, POC: NEGATIVE
RBC # BLD AUTO: 4.12 M/UL (ref 3.93–5.22)
WBC # BLD AUTO: 13.8 K/UL (ref 3.8–10.5)

## 2018-10-22 PROCEDURE — 85025 COMPLETE CBC W/AUTO DIFF WBC: CPT | Performed by: INTERNAL MEDICINE

## 2018-10-22 PROCEDURE — 36415 COLL VENOUS BLD VENIPUNCTURE: CPT | Performed by: INTERNAL MEDICINE

## 2018-10-22 PROCEDURE — 81002 URINALYSIS NONAUTO W/O SCOPE: CPT | Performed by: OBSTETRICS & GYNECOLOGY

## 2018-10-22 PROCEDURE — 0502F SUBSEQUENT PRENATAL CARE: CPT | Performed by: OBSTETRICS & GYNECOLOGY

## 2018-10-22 NOTE — TELEPHONE ENCOUNTER
Called L+D to check time of induction, scheduled for fri 10/26/18 coming in the night before at 9 pm, with dr medeiros

## 2018-10-24 ENCOUNTER — HOSPITAL ENCOUNTER (INPATIENT)
Facility: HOSPITAL | Age: 38
LOS: 3 days | Discharge: HOME | End: 2018-10-27
Attending: OBSTETRICS & GYNECOLOGY | Admitting: OBSTETRICS & GYNECOLOGY
Payer: COMMERCIAL

## 2018-10-24 ENCOUNTER — TELEPHONE (OUTPATIENT)
Dept: OBSTETRICS AND GYNECOLOGY | Facility: CLINIC | Age: 38
End: 2018-10-24

## 2018-10-24 DIAGNOSIS — Z3A.39 39 WEEKS GESTATION OF PREGNANCY: Primary | ICD-10-CM

## 2018-10-24 PROBLEM — Z34.90 PREGNANCY: Status: ACTIVE | Noted: 2018-10-24

## 2018-10-24 LAB
ABO + RH BLD: NORMAL
BLD GP AB SCN SERPL QL: NEGATIVE
D AG BLD QL: POSITIVE
ERYTHROCYTE [DISTWIDTH] IN BLOOD BY AUTOMATED COUNT: 12.2 % (ref 11.7–14.4)
HCT VFR BLDCO AUTO: 41.6 % (ref 35–45)
HGB BLD-MCNC: 14.8 G/DL (ref 11.8–15.7)
LABORATORY COMMENT REPORT: NORMAL
MCH RBC QN AUTO: 32.7 PG (ref 28–33.2)
MCHC RBC AUTO-ENTMCNC: 35.6 G/DL (ref 32.2–35.5)
MCV RBC AUTO: 92 FL (ref 83–98)
PDW BLD AUTO: 12.4 FL (ref 9.4–12.3)
PLATELET # BLD AUTO: 125 K/UL (ref 150–369)
RBC # BLD AUTO: 4.52 M/UL (ref 3.93–5.22)
WBC # BLD AUTO: 13.81 K/UL (ref 3.8–10.5)

## 2018-10-24 PROCEDURE — 36415 COLL VENOUS BLD VENIPUNCTURE: CPT | Performed by: STUDENT IN AN ORGANIZED HEALTH CARE EDUCATION/TRAINING PROGRAM

## 2018-10-24 PROCEDURE — 85027 COMPLETE CBC AUTOMATED: CPT | Performed by: STUDENT IN AN ORGANIZED HEALTH CARE EDUCATION/TRAINING PROGRAM

## 2018-10-24 PROCEDURE — 72000011 HC VAGINAL DELIVERY LEVEL 1

## 2018-10-24 PROCEDURE — 86592 SYPHILIS TEST NON-TREP QUAL: CPT | Performed by: STUDENT IN AN ORGANIZED HEALTH CARE EDUCATION/TRAINING PROGRAM

## 2018-10-24 PROCEDURE — 12000000 HC ROOM AND CARE MED/SURG

## 2018-10-24 PROCEDURE — 86900 BLOOD TYPING SEROLOGIC ABO: CPT

## 2018-10-24 RX ORDER — SODIUM CHLORIDE, SODIUM LACTATE, POTASSIUM CHLORIDE, CALCIUM CHLORIDE 600; 310; 30; 20 MG/100ML; MG/100ML; MG/100ML; MG/100ML
125 INJECTION, SOLUTION INTRAVENOUS CONTINUOUS
Status: DISCONTINUED | OUTPATIENT
Start: 2018-10-24 | End: 2018-10-26

## 2018-10-25 ENCOUNTER — ANESTHESIA (INPATIENT)
Dept: OBSTETRICS AND GYNECOLOGY | Facility: HOSPITAL | Age: 38
End: 2018-10-25
Payer: COMMERCIAL

## 2018-10-25 ENCOUNTER — ANESTHESIA EVENT (INPATIENT)
Dept: OBSTETRICS AND GYNECOLOGY | Facility: HOSPITAL | Age: 38
End: 2018-10-25
Payer: COMMERCIAL

## 2018-10-25 LAB — RPR SER QL: NORMAL

## 2018-10-25 PROCEDURE — 37000005 ANESTHESIA EPIDURAL BLOCK: Performed by: ANESTHESIOLOGY

## 2018-10-25 PROCEDURE — 59400 OBSTETRICAL CARE: CPT | Performed by: OBSTETRICS & GYNECOLOGY

## 2018-10-25 PROCEDURE — 25000000 HC PHARMACY GENERAL: Performed by: ANESTHESIOLOGY

## 2018-10-25 PROCEDURE — 25000000 HC PHARMACY GENERAL: Performed by: STUDENT IN AN ORGANIZED HEALTH CARE EDUCATION/TRAINING PROGRAM

## 2018-10-25 PROCEDURE — 12000000 HC ROOM AND CARE MED/SURG

## 2018-10-25 PROCEDURE — 63700000 HC SELF-ADMINISTRABLE DRUG: Performed by: STUDENT IN AN ORGANIZED HEALTH CARE EDUCATION/TRAINING PROGRAM

## 2018-10-25 PROCEDURE — 63600000 HC DRUGS/DETAIL CODE: Performed by: STUDENT IN AN ORGANIZED HEALTH CARE EDUCATION/TRAINING PROGRAM

## 2018-10-25 PROCEDURE — 63700000 HC SELF-ADMINISTRABLE DRUG: Performed by: OBSTETRICS & GYNECOLOGY

## 2018-10-25 PROCEDURE — 0HQ9XZZ REPAIR PERINEUM SKIN, EXTERNAL APPROACH: ICD-10-PCS | Performed by: OBSTETRICS & GYNECOLOGY

## 2018-10-25 RX ORDER — DIPHENHYDRAMINE HCL 25 MG
25 CAPSULE ORAL NIGHTLY PRN
Status: DISCONTINUED | OUTPATIENT
Start: 2018-10-25 | End: 2018-10-26

## 2018-10-25 RX ORDER — DIPHENHYDRAMINE HCL 25 MG
25 CAPSULE ORAL EVERY 6 HOURS PRN
Status: DISCONTINUED | OUTPATIENT
Start: 2018-10-25 | End: 2018-10-27 | Stop reason: HOSPADM

## 2018-10-25 RX ORDER — BISACODYL 10 MG/1
10 SUPPOSITORY RECTAL DAILY PRN
Status: DISCONTINUED | OUTPATIENT
Start: 2018-10-25 | End: 2018-10-27 | Stop reason: HOSPADM

## 2018-10-25 RX ORDER — POLYETHYLENE GLYCOL 3350 17 G/17G
17 POWDER, FOR SOLUTION ORAL DAILY PRN
Status: DISCONTINUED | OUTPATIENT
Start: 2018-10-25 | End: 2018-10-27 | Stop reason: HOSPADM

## 2018-10-25 RX ORDER — OXYCODONE HYDROCHLORIDE 5 MG/1
5-10 TABLET ORAL EVERY 4 HOURS PRN
Status: DISCONTINUED | OUTPATIENT
Start: 2018-10-25 | End: 2018-10-27 | Stop reason: HOSPADM

## 2018-10-25 RX ORDER — IBUPROFEN 600 MG/1
600 TABLET ORAL EVERY 6 HOURS PRN
Status: DISCONTINUED | OUTPATIENT
Start: 2018-10-25 | End: 2018-10-27 | Stop reason: HOSPADM

## 2018-10-25 RX ORDER — ONDANSETRON 4 MG/1
4 TABLET, ORALLY DISINTEGRATING ORAL EVERY 8 HOURS PRN
Status: DISCONTINUED | OUTPATIENT
Start: 2018-10-25 | End: 2018-10-27 | Stop reason: HOSPADM

## 2018-10-25 RX ORDER — SODIUM CHLORIDE 9 MG/ML
125 INJECTION, SOLUTION INTRAVENOUS CONTINUOUS
Status: DISCONTINUED | OUTPATIENT
Start: 2018-10-25 | End: 2018-10-26

## 2018-10-25 RX ORDER — ACETAMINOPHEN 325 MG/1
650 TABLET ORAL EVERY 4 HOURS PRN
Status: DISCONTINUED | OUTPATIENT
Start: 2018-10-25 | End: 2018-10-27 | Stop reason: HOSPADM

## 2018-10-25 RX ORDER — ONDANSETRON HYDROCHLORIDE 2 MG/ML
4 INJECTION, SOLUTION INTRAVENOUS EVERY 8 HOURS PRN
Status: DISCONTINUED | OUTPATIENT
Start: 2018-10-25 | End: 2018-10-27 | Stop reason: HOSPADM

## 2018-10-25 RX ORDER — DIBUCAINE 1 %
1 OINTMENT (GRAM) TOPICAL AS NEEDED
Status: DISCONTINUED | OUTPATIENT
Start: 2018-10-25 | End: 2018-10-27 | Stop reason: HOSPADM

## 2018-10-25 RX ORDER — OXYTOCIN/0.9 % SODIUM CHLORIDE 30/500 ML
0-20 PLASTIC BAG, INJECTION (ML) INTRAVENOUS CONTINUOUS
Status: DISCONTINUED | OUTPATIENT
Start: 2018-10-25 | End: 2018-10-26

## 2018-10-25 RX ORDER — LANOLIN
1 WAX (GRAM) MISCELLANEOUS AS NEEDED
Status: DISCONTINUED | OUTPATIENT
Start: 2018-10-25 | End: 2018-10-27 | Stop reason: HOSPADM

## 2018-10-25 RX ORDER — CALCIUM CARBONATE 200(500)MG
500 TABLET,CHEWABLE ORAL EVERY 4 HOURS PRN
Status: DISCONTINUED | OUTPATIENT
Start: 2018-10-25 | End: 2018-10-27 | Stop reason: HOSPADM

## 2018-10-25 RX ORDER — OXYTOCIN/0.9 % SODIUM CHLORIDE 20/1000 ML
125 PLASTIC BAG, INJECTION (ML) INTRAVENOUS ONCE AS NEEDED
Status: COMPLETED | OUTPATIENT
Start: 2018-10-25 | End: 2018-10-25

## 2018-10-25 RX ORDER — LIDOCAINE HYDROCHLORIDE AND EPINEPHRINE 15; 5 MG/ML; UG/ML
INJECTION, SOLUTION EPIDURAL AS NEEDED
Status: DISCONTINUED | OUTPATIENT
Start: 2018-10-25 | End: 2018-10-25 | Stop reason: SURG

## 2018-10-25 RX ORDER — DIPHENHYDRAMINE HCL 50 MG/ML
25 VIAL (ML) INJECTION EVERY 6 HOURS PRN
Status: DISCONTINUED | OUTPATIENT
Start: 2018-10-25 | End: 2018-10-27 | Stop reason: HOSPADM

## 2018-10-25 RX ORDER — ALUMINUM HYDROXIDE, MAGNESIUM HYDROXIDE, AND SIMETHICONE 1200; 120; 1200 MG/30ML; MG/30ML; MG/30ML
30 SUSPENSION ORAL EVERY 4 HOURS PRN
Status: DISCONTINUED | OUTPATIENT
Start: 2018-10-25 | End: 2018-10-27 | Stop reason: HOSPADM

## 2018-10-25 RX ORDER — AMOXICILLIN 250 MG
1 CAPSULE ORAL 2 TIMES DAILY
Status: DISCONTINUED | OUTPATIENT
Start: 2018-10-25 | End: 2018-10-27 | Stop reason: HOSPADM

## 2018-10-25 RX ADMIN — Medication 50 ML: at 03:36

## 2018-10-25 RX ADMIN — Medication 125 ML/HR: at 08:34

## 2018-10-25 RX ADMIN — SENNOSIDES AND DOCUSATE SODIUM 1 TABLET: 8.6; 5 TABLET ORAL at 10:36

## 2018-10-25 RX ADMIN — BENZOCAINE AND LEVOMENTHOL: 200; 5 SPRAY TOPICAL at 10:36

## 2018-10-25 RX ADMIN — LIDOCAINE HYDROCHLORIDE,EPINEPHRINE BITARTRATE 5 ML: 15; .005 INJECTION, SOLUTION EPIDURAL; INFILTRATION; INTRACAUDAL; PERINEURAL at 03:18

## 2018-10-25 RX ADMIN — DIBUCAINE 1 APPLICATION.: 1 OINTMENT TOPICAL at 10:36

## 2018-10-25 RX ADMIN — Medication 2 MILLI-UNITS/MIN: at 02:09

## 2018-10-25 RX ADMIN — DIPHENHYDRAMINE HYDROCHLORIDE 25 MG: 25 CAPSULE ORAL at 02:01

## 2018-10-25 RX ADMIN — PRENATAL VIT W/ FE FUMARATE-FA TAB 27-0.8 MG 1 TABLET: 27-0.8 TAB at 10:36

## 2018-10-25 RX ADMIN — IBUPROFEN 600 MG: 600 TABLET, FILM COATED ORAL at 20:39

## 2018-10-25 RX ADMIN — SODIUM CHLORIDE, POTASSIUM CHLORIDE, SODIUM LACTATE AND CALCIUM CHLORIDE 125 ML/HR: 600; 310; 30; 20 INJECTION, SOLUTION INTRAVENOUS at 02:08

## 2018-10-25 RX ADMIN — SENNOSIDES AND DOCUSATE SODIUM 1 TABLET: 8.6; 5 TABLET ORAL at 19:41

## 2018-10-25 ASSESSMENT — ENCOUNTER SYMPTOMS: HEADACHES: 1

## 2018-10-25 NOTE — ANESTHESIA PROCEDURE NOTES
Epidural Block    Patient location during procedure: OB  Start time: 10/25/2018 3:13 AM  End time: 10/25/2018 3:23 AM  Reason for block: labor analgesia requested by patient and obstetrician  Staffing  Anesthesiologist: PETERSON VALLECILLO  Performed: anesthesiologist   Preanesthetic Checklist  Completed: patient identified, surgical consent, pre-op evaluation, timeout performed, IV checked, risks and benefits discussed, monitors and equipment checked and sterile field maintained during procedure  Epidural  Patient position: sitting  Prep: Betadine  Patient monitoring: heart rate, continuous pulse ox and blood pressure  Approach: midline  Location: lumbar (1-5)  Injection technique: KAREN air  Needle  Needle type: Tuohy   Needle gauge: 17 G  Needle length: 3.5 in  Catheter type: Single orifice  Catheter size: 19 G  Catheter at skin depth: 12 cm  Test dose: negative and lidocaine 1.5% with epinephrine 1-to-200,000  Assessment  Sensory level: T10  Additional Notes  Procedure well tolerated. Vital signs stable. No paresthesia.  Analgesia satisfactory. Patients nurse to notify anesthesiologist if hemodynamically unstable.

## 2018-10-25 NOTE — PROGRESS NOTES
37yo  @ 39w6d in labor. Comfortable with epidural.    Temp:  [36.4 °C (97.6 °F)-36.8 °C (98.2 °F)] 36.8 °C (98.2 °F)  Heart Rate:  [52-54] 52  Resp:  [18] 18  BP: (131-133)/(75-80) 131/80  FHT: 130/mod yaa/-accels/non recurr late/ctx q3min  CVE: /-1    A/P: Tatum Correa is a 37yo  @ 39w6d in labor, gestational thrombocytopenia.  -FHT cat 2 due to non recurr late overall reassuring with mod yaa, c/w positional change and IVF  -GBS neg  -Labor: On pitocin, ctx q3min, with cervical change.   -Gestational thrombocytopenia: Resolved. s/p heme cs as an outpatient and 10 day course of prednisone. Plt on admission is 125.    Plan d/w Dr. Barone.    Maximiliano Mclaughlin MD

## 2018-10-25 NOTE — PROGRESS NOTES
39yo  @ 39w6d in labor. Pt reports her contractions have spaced out and decreased in intensity.    Temp:  [36.4 °C (97.6 °F)] 36.4 °C (97.6 °F)  Heart Rate:  [54] 54  Resp:  [18] 18  BP: (133)/(75) 133/75  FHT: 140/mod yaa/+accels/-decels/ctx q6min  CVE: 3-4/80/-3     A/P: Tatum Correa is a 39yo  @ 39w6d in labor, gestational thrombocytopenia.  -FHT cat 1  -GBS neg  -Labor: Minimal cervical change, ctx q6min. Will start pitocin. Epidural PRN.  -Gestational thrombocytopenia: Resolved. s/p heme cs as an outpatient and 10 day course of prednisone. Plt on admission is 125.    Plan d/w Dr. Barone.    Maximiliano Mclaughlin MD

## 2018-10-25 NOTE — H&P
HPI     Tatum Correa is a 38 y.o. female  at 39w5d with an estimated due date of 10/26/2018, by 1st trimester in office ultrasound who presents with LOF and contractions. She reports contractions started around 6pm and got more intense around 8pm occurring every 3min. When she arrived at the hospital at 9:45pm and got out of her car, she felt a gush of fluid between her legs, soaking through her leggings and continued to leak fluid. She denies VB and reports good FM.    Her pregnancy has been complicated by gestational thrombocytopenia s/p hematology consult and has been on prednisone 20mg qD for 10 days. Her latest platelet count was 117 on 10/22. She denies gHTN/GDM in this pregnancy.     OB History:   Obstetric History       T1      L1     SAB0   TAB0   Ectopic0   Multiple0   Live Births1       # Outcome Date GA Lbr Clif[place]/2nd Weight Sex Delivery Anes PTL Lv   2 Current            1 Term    2.637 kg F Vag-Spont EPI  BJ      Complications: Gestation Hypertension (GHTN)        GYN History: Denies history of abnormal Pap, fibroids and ovarian cysts.    Medical History:   Past Medical History:   Diagnosis Date   • Gestational thrombocytopenia (CMS/HCC) (HCC)    • Gestational thrombocytopenia (CMS/HCC) (HCC)        Surgical History:   Past Surgical History:   Procedure Laterality Date   • NASAL SINUS SURGERY     • WISDOM TOOTH EXTRACTION         Social History:   Social History     Social History   • Marital status:      Spouse name: Shakeel   • Number of children: N/A   • Years of education: N/A     Social History Main Topics   • Smoking status: Never Smoker   • Smokeless tobacco: Never Used   • Alcohol use No   • Drug use: No   • Sexual activity: Yes     Partners: Male     Birth control/ protection: None     Other Topics Concern   • None     Social History Narrative   • None        Family History:   Family History   Problem Relation Age of Onset   • Prostate cancer Father        Allergies:  Patient has no known allergies.    Prior to Admission medications    Medication Sig Start Date End Date Taking? Authorizing Provider   aspirin 81 mg enteric coated tablet Take 81 mg by mouth daily.    Connie Durbin MD   folic acid (FOLVITE) 1 mg tablet Take 1 mg by mouth daily.    Connie Durbin MD   predniSONE (DELTASONE) 20 mg tablet Take 1 tablet (20 mg total) by mouth daily for 10 days. 10/16/18 10/26/18  Todd Nunez MD   PRENATAL VITS96/IRON FUM/FOLIC (PRE- VITAMIN) 27 mg iron- 800 mcg tablet Take by mouth daily.    ProviderConnie MD       Review of Systems  Pertinent items are noted in HPI.    Objective     Vital Signs for the last 24 hours:  Temp:  [36.4 °C (97.6 °F)] 36.4 °C (97.6 °F)  Heart Rate:  [54] 54  Resp:  [18] 18  BP: (133)/(75) 133/75    Latest cervical exam:  Cervical Dilation (cm): 3  Cervical Effacement: 70  Fetal Station: -2  Method: sterile exam per physician (herb) (10/24/18 2357)    Additional Tests:   Sterile Speculum Exam: yes  Pooling: positive  Nitrazine: positive  Ferning: positive  Bleeding: negative    Fetal Monitoring:  FHR Baseline: 135  FHR Variability: Moderate  FHR Accelerations: 15x15 >2  FHR Decelerations: non recurr late    Contraction Frequency: q3-4min    Physical Exam:  Gen: AAOx3, NAD  Heart: RRR, no murmur, rub or gallop  Lungs: CTAB  Abdomen: Gravid, nontender  Extremities: No edema bilaterally or calf tenderness  Neurologic: Grossly intact without focal deficits    Bedside Ultrasounds:   cephalic    Assessment/Plan     Tatum Correa is a 38 y.o. female  at 39w5d admitted for labor.    1. FHR: Category II 2/2 non recurr late, reassuring with mod yaa and accels  2. GBS: negative  3. Labor   -CVE change with contractions. Light meconium stained amniotic fluid. Admit to L&D, CBC, RPR, IVF. Epidural PRN. Will expectantly manage. Recheck in 3 h.  4. Gestational thrombocytopenia   -s/p heme consult as an outpatient with 10 days of 20mg.  Last platelet count was 117 on 10/22.     Plan d/w Dr. Barone.    Maximiliano Mclaughlin MD

## 2018-10-25 NOTE — L&D DELIVERY NOTE
OB Vaginal Delivery Note    Delivery:10/25/2018 at 7:06 AM   Patient:Tatum Correa  :1980    Review the Delivery Report for details.     Delivery Details    Pre-Op Diagnosis: 1. 38 y.o.  intrauterine pregnancy at 39w6d with askew gestation.  2. Gestational thrombocytopenia  3. Spontaneous rupture of membranes with meconium stained amniotic fluid  4. GBS negative   Post-Op Diagnosis: 1. Spontaneous vaginal delivery  2. Viable male infant  3. First degree laceration, repaired   Delivery Clinician: Viv Barone    Delivery Assist;Delivery Nurse;Delivery Nurse;Nursery Nurse  Maximiliano Mclaughlin;Clarisse Tamayo;Rukhsana العلي;Jeanne M Thoeny    Delivery Type: Vaginal, Spontaneous Delivery    Labor Complications:      EBL: 350  mL   Anesthesia Type: Epidural    Placenta Delivery Spontaneous    Placenta Disposition: discarded    Additional Specimens Cord Blood      INFANT INFORMATION  Time of Birth:7:06 AM   Presentation: Vertex   Position:Left ,Occiput ,Anterior   Cord: 3 vessels ,Complications:None    Sex: male    Weight: 3.26 kg      1 Minute 5 Minute 10 Minute   Apgar Totals: 8    9            Information for the patient's :  Wilman Correa  [[id number]]      Cord Gas     None          Delivery Details:    Tatum Correa is a 38 y.o.  at 39w6d gestation who presented to Labor & Delivery on 10/24/18 with a chief complaint of contractions and leakage of fluid. On evaluation, her cervix was found to be 3cm dilated, 70 percent effaced, with the fetal vertex at the -2 station. Fetal heart tones were category II due to non recurrent late decelerations, membranes were ruptured, and Group B streptococcus status was negative.    The patient received epidural at her request. Labor was augmented with pitocin.  She progressed to fully dilated pushed for 11 min. With both resident and attending at the bedside, the patient delivered a viable male  by VALENTE position. Upon delivery  of the head, there was no nuchal cord noted in need of reduction. The anterior shoulder, which was the right, delivered with ease followed by the posterior should and the remainder of the infant. A spontaneous cry was heard, and the infant appeared to be moving all four extremities. The umbilical cord was clamped after 60 seconds of delay. The infant was placed on the maternal abdomen under direct nursing supervision.     Fundal massage was performed and the placenta delivered spontaneously shortly thereafter. The placenta was inspected and felt to be complete with a three vessel cord.  A thorough inspection of the vagina and perineum revealed a first degree perineal laceration, which was repaired with 3-0 vicryl rapide and right labial laceration, which was reapproximated with 3-0 vicryl. Upon completion of the repair, there was an excellent hemostatic and cosmetic result.    At the completion of the case, all sponges, needles and instruments were removed from the vagina. All sponge and needle counts were found to be correct.  The uterine fundus was massaged, felt to be firm, and lochia was within normal limits. The patient´s epidural catheter was removed with the black tip noted to be intact. The patient tolerated the procedure well and was left with the infant in the delivery room to recover in stable condition.     Dr. Barone was scrubbed and present for the duration of the delivery.      Maximiliano Mclaughlin MD

## 2018-10-25 NOTE — PLAN OF CARE
Problem: Patient Care Overview  Goal: Plan of Care Review  Outcome: Ongoing (interventions implemented as appropriate)   10/25/18 1237   Coping/Psychosocial   Plan Of Care Reviewed With patient   Plan of Care Review   Progress improving     Goal: Individualization & Mutuality  Outcome: Ongoing (interventions implemented as appropriate)    Goal: Discharge Needs Assessment  Outcome: Ongoing (interventions implemented as appropriate)      Problem: Postpartum (Vaginal Delivery) (Adult,Obstetrics,Pediatric)  Goal: Signs and Symptoms of Listed Potential Problems Will be Absent, Minimized or Managed (Postpartum)  Outcome: Ongoing (interventions implemented as appropriate)

## 2018-10-25 NOTE — ANESTHESIA PREPROCEDURE EVALUATION
"        Anesthesia ROS/MED HX    Anesthesia History - neg  Pulmonary - neg  Neuro/Psych    Headaches  Cardiovascular- neg  Hematological - neg  GI/Hepatic- neg  Musculoskeletal- neg  Renal Disease- neg  Endo/Other- neg  Body Habitus: Normal      Relevant Problems   No relevant active problems       Physical Exam    Airway   Mallampati: II   TM distance: <3 FB   Neck ROM: full  Cardiovascular - normal   Rhythm: regular   Rate: normal  Pulmonary - normal   clear to auscultation  Other Findings   Back - neg   landmarks identified    Dental - normal      Blood pressure 131/80, pulse (!) 52, temperature 36.8 °C (98.2 °F), temperature source Oral, resp. rate 18, height 1.727 m (5' 8\"), weight 60.8 kg (134 lb).      Anesthesia Plan    Plan: labor epidural     Lines and Monitors: PIV   ASA 2 - emergent  Blood Products:     Use of Blood Products Discussed: Yes   Anesthetic plan and risks discussed with: patient  Postop Plan:   Patient Disposition: inpatient floor planned admission   Pain Management: epidural    "

## 2018-10-26 LAB
ERYTHROCYTE [DISTWIDTH] IN BLOOD BY AUTOMATED COUNT: 12.3 % (ref 11.7–14.4)
HCT VFR BLDCO AUTO: 35.6 % (ref 35–45)
HGB BLD-MCNC: 12.5 G/DL (ref 11.8–15.7)
MCH RBC QN AUTO: 32.6 PG (ref 28–33.2)
MCHC RBC AUTO-ENTMCNC: 35.1 G/DL (ref 32.2–35.5)
MCV RBC AUTO: 93 FL (ref 83–98)
PDW BLD AUTO: 12.4 FL (ref 9.4–12.3)
PLATELET # BLD AUTO: 121 K/UL (ref 150–369)
RBC # BLD AUTO: 3.83 M/UL (ref 3.93–5.22)
WBC # BLD AUTO: 11.87 K/UL (ref 3.8–10.5)

## 2018-10-26 PROCEDURE — 63700000 HC SELF-ADMINISTRABLE DRUG: Performed by: OBSTETRICS & GYNECOLOGY

## 2018-10-26 PROCEDURE — 85027 COMPLETE CBC AUTOMATED: CPT | Performed by: OBSTETRICS & GYNECOLOGY

## 2018-10-26 PROCEDURE — 36415 COLL VENOUS BLD VENIPUNCTURE: CPT | Performed by: OBSTETRICS & GYNECOLOGY

## 2018-10-26 PROCEDURE — 12000000 HC ROOM AND CARE MED/SURG

## 2018-10-26 RX ORDER — IBUPROFEN 600 MG/1
600 TABLET ORAL EVERY 6 HOURS PRN
Qty: 60 TABLET | Refills: 2 | Status: SHIPPED | OUTPATIENT
Start: 2018-10-26 | End: 2018-11-05

## 2018-10-26 RX ADMIN — SENNOSIDES AND DOCUSATE SODIUM 1 TABLET: 8.6; 5 TABLET ORAL at 07:56

## 2018-10-26 RX ADMIN — IBUPROFEN 600 MG: 600 TABLET, FILM COATED ORAL at 13:46

## 2018-10-26 RX ADMIN — SENNOSIDES AND DOCUSATE SODIUM 1 TABLET: 8.6; 5 TABLET ORAL at 20:21

## 2018-10-26 RX ADMIN — PRENATAL VIT W/ FE FUMARATE-FA TAB 27-0.8 MG 1 TABLET: 27-0.8 TAB at 07:56

## 2018-10-26 NOTE — LACTATION NOTE
"Mother reports baby BF well so far and able to visualize colostrum at breast. BF first child without issues. Frenulum identified by pediatrician Dr. Murcia, but mother denies nipple pain or latching difficulties. Baby currently latched shallow at L breast. Assisted into deeper latch using cross cradle positioning. Shown hand expression and spoon feeding, +++colostrum expressed. Baby will have circumcision today- encouraged S2S and spoon feeding as way to encourage feeding. Reviewed basic breastfeeding info, expected milk production and feeding cues. Reviewed \"A New Beginning\" book, monitoring output and outpatient LC resources. Has pump. Questions answered. Aware to call for latch assist PRN.  "

## 2018-10-26 NOTE — PROGRESS NOTES
Postpartum Progress Note    Events  No acute events overnight. Pt denies chest pain, shortness of breath, Ha, dizziness, palpitations, or N/V/F/C    Subjective  Pain: controlled   Bleeding: lochia minimal  Diet: taking regular diet  Voiding: without difficulty  Bowel: passing flatus  Ambulating: as tolerated       Vitals  Temp:  [36.3 °C (97.3 °F)-36.9 °C (98.4 °F)] 36.7 °C (98.1 °F)  Heart Rate:  [53-78] 54  Resp:  [16-18] 16  BP: (107-137)/(68-94) 107/72    I&O    Intake/Output Summary (Last 24 hours) at 10/26/18 0616  Last data filed at 10/25/18 1030   Gross per 24 hour   Intake              950 ml   Output             1450 ml   Net             -500 ml       Physical Exam  General: well and resting  Heart: Regular rate and rhythm  Lungs: Clear to auscultation bilaterally  Abdomen: soft, nondistended, non-tender, positive bowel sounds  Fundus: firm  Perineum: deferred  Extremities: symmetric and no edema    Labs  Labs Reviewed:  Lab Results   Component Value Date    ABO O 10/24/2018    LABRH Positive 10/24/2018      Rubella: immune  CBC Results       10/24/18 10/22/18 10/16/18                    2251 1108 0831         WBC 13.81 (H) 13.80 (H) 9.44         RBC 4.52 4.12 4.01         HGB 14.8 13.6 13.2         HCT 41.6 38.9 38.1         MCV 92.0 94.4 95.0         MCH 32.7 33.0 32.9         MCHC 35.6 (H) 35.0 34.6          (L) 117 (L) 102 (L)                       Assessment/Plan   Problem-based Assessment and Plan    Tatum Correa is a 38 y.o.  postpartum day 1 s/p Vaginal, Spontaneous Delivery .    1. Vital Signs: stable, bps all normotensive with 1 mild outlier, does not meet criteria for gHTN at this time   2. Hemodynamics: stable, hg 14.8 --> CBC pending this am, no ss/x of anemia at this time   3. Pain: controlled  4. VTE Assessment: Early Ambulation, SCDs  5. Vaccinations/Rhogam: rhogam not indicated   6. Post care: meeting all goals     Geno Freitas MD

## 2018-10-26 NOTE — DISCHARGE SUMMARY
Date of Delivery: 10/25/2018 at 7:06 AM     Gestational Age:39w6d    Delivered By: Viv Barone     Delivery Type: spontaneous vaginal delivery    Antepartum complications: low platlets    Baby: Liveborn male , Apgars 8   /9   , 3.26 kg     Anesthesia: Epidural     Intrapartum complications: None    Laceration: 1st     Feeding method: breast    Rh Immune globulin given: not applicable    Discharge Date: 112618      Plan:    Follow-up appointment with your doctors in 6 weeks, please call for an appointment

## 2018-10-26 NOTE — ANESTHESIA POSTPROCEDURE EVALUATION
Patient: Tatum Correa    Procedure Summary     Date:  10/25/18 Room / Location:      Anesthesia Start:  0313 Anesthesia Stop:  0706    Procedure:  Labor Analgesia Diagnosis:      Scheduled Providers:   Responsible Provider:  Benedict Palencia MD    Anesthesia Type:  labor epidural ASA Status:  2 - Emergent          Anesthesia Type: labor epidural  PACU Vitals     No data found.            Anesthesia Post Evaluation

## 2018-10-27 VITALS
DIASTOLIC BLOOD PRESSURE: 58 MMHG | WEIGHT: 134 LBS | HEART RATE: 56 BPM | SYSTOLIC BLOOD PRESSURE: 106 MMHG | RESPIRATION RATE: 18 BRPM | TEMPERATURE: 98.2 F | HEIGHT: 68 IN | OXYGEN SATURATION: 98 % | BODY MASS INDEX: 20.31 KG/M2

## 2018-10-27 PROCEDURE — 63700000 HC SELF-ADMINISTRABLE DRUG: Performed by: OBSTETRICS & GYNECOLOGY

## 2018-10-27 RX ADMIN — PRENATAL VIT W/ FE FUMARATE-FA TAB 27-0.8 MG 1 TABLET: 27-0.8 TAB at 09:21

## 2018-10-27 RX ADMIN — SENNOSIDES AND DOCUSATE SODIUM 1 TABLET: 8.6; 5 TABLET ORAL at 09:21

## 2018-10-27 NOTE — LACTATION NOTE
Per mom, infant cluster fed last night, very fussy at breast and with a high temp.  Infant supplemented with formula x1 overnight and has been latching well since.  Discussed cluster feeding, importance of breast stimulation, skin to skin, output monitoring, and normal  feeding cues/patterns/behaviors.  Encouraged to continue latching on demand, skin to skin for comfort and temperature regulation, and monitor for milk arrival as well as infant's outputs.  Questions answered and aware to call for latch check prior to discharge prn.

## 2018-11-08 ENCOUNTER — OFFICE VISIT (OUTPATIENT)
Dept: OBSTETRICS AND GYNECOLOGY | Facility: CLINIC | Age: 38
End: 2018-11-08
Payer: COMMERCIAL

## 2018-11-08 ENCOUNTER — TELEPHONE (OUTPATIENT)
Dept: OBSTETRICS AND GYNECOLOGY | Facility: CLINIC | Age: 38
End: 2018-11-08

## 2018-11-08 VITALS — SYSTOLIC BLOOD PRESSURE: 110 MMHG | WEIGHT: 122 LBS | DIASTOLIC BLOOD PRESSURE: 60 MMHG | BODY MASS INDEX: 18.55 KG/M2

## 2018-11-08 PROCEDURE — 59160 D & C AFTER DELIVERY: CPT | Performed by: OBSTETRICS & GYNECOLOGY

## 2018-11-08 PROCEDURE — 99999 PR OFFICE/OUTPT VISIT,PROCEDURE ONLY: CPT | Performed by: OBSTETRICS & GYNECOLOGY

## 2018-11-08 PROCEDURE — 76817 TRANSVAGINAL US OBSTETRIC: CPT | Performed by: OBSTETRICS & GYNECOLOGY

## 2018-11-08 RX ORDER — AMOXICILLIN AND CLAVULANATE POTASSIUM 875; 125 MG/1; MG/1
1 TABLET, FILM COATED ORAL 2 TIMES DAILY
Qty: 14 TABLET | Refills: 0 | Status: SHIPPED | OUTPATIENT
Start: 2018-11-08 | End: 2018-11-15

## 2018-11-08 NOTE — PROGRESS NOTES
Subjective     Patient ID: Tatum Correa is a 38 y.o. female.    HPI   Bleeding     Review of Systems    Objective     Vitals:    11/08/18 1314   BP: 110/60   BP Location: Left upper arm   Patient Position: Sitting   Weight: 55.3 kg (122 lb)     Body mass index is 18.55 kg/m².    Physical Exam    Bright red, changing pad 1 to 3 times a day, blood behind uterus  behind, delivered 2 weeks ago, she had normal vaginal delivery no clots, bright red      Ultrasound  Blood Clot,  small debresion is 2.67cm x 2.8x 3.4    No uterine retro visual results, tincalum applied 6 mm custion tindst tno complication two passes milsx retrained preodyct  tissue pst op em[ty augmentin 875 mg         Assessment/Plan   Problem List Items Addressed This Visit     None

## 2018-11-08 NOTE — TELEPHONE ENCOUNTER
Pt is pp 10/25/18 vaginal, states she is still bleeding said it is moderate/heavy.  Doesn't know if it should still be like this, please call

## 2018-11-13 LAB
DX ICD CODE: NORMAL
PATH REPORT.FINAL DX SPEC: NORMAL
PATH REPORT.GROSS SPEC: NORMAL
PATH REPORT.SITE OF ORIGIN SPEC: NORMAL
PATHOLOGIST NAME: NORMAL
PAYMENT PROCEDURE: NORMAL

## 2018-12-06 ENCOUNTER — OFFICE VISIT (OUTPATIENT)
Dept: OBSTETRICS AND GYNECOLOGY | Facility: CLINIC | Age: 38
End: 2018-12-06
Payer: COMMERCIAL

## 2018-12-06 VITALS — BODY MASS INDEX: 18.55 KG/M2 | WEIGHT: 122 LBS | SYSTOLIC BLOOD PRESSURE: 120 MMHG | DIASTOLIC BLOOD PRESSURE: 70 MMHG

## 2018-12-06 PROCEDURE — 0503F POSTPARTUM CARE VISIT: CPT | Performed by: OBSTETRICS & GYNECOLOGY

## 2018-12-06 NOTE — PROGRESS NOTES
Tatum Correa 1980 is a 38 y.o. female who presents for a postpartum visit.    She is 2 weeks postpartum following a    I have fully reviewed the prenatal and intrapartum course.   The delivery was at gestational weeks 39w6d    Postpartum course has been uncomplicated. Baby's course has been uncomplicated  Baby is feeding by breastfeeding  Bleeding no bleeding.   Bowel function is normal.   Bladder function is normal.   Patient is not sexually active.   Contraception method is none.  Postpartum depression screening: negative      Objective     Vitals:    12/06/18 1307   BP: 120/70      Abdomen: soft, non-tender; bowel sounds normal; no masses, no organomegaly  Vulva:  not evaluated  Vagina: not evaluated  Cervix:  no bleeding following Pap and no cervical motion tenderness  Corpus: not examined  Adnexa:  not evaluated    Assessment/Plan   Both kids VFI had D&E  Has rt subacues cyst       ultrasound  endometrium is thin, try a lubricant, can return to all activity   Follow up in 3 months for routine annual check up or as needed.    IUsha MA, am scribing for, and in the presence of, Todd Nunez MD.    12/11/2018 8:13 AM

## 2019-01-20 ENCOUNTER — TELEPHONE (OUTPATIENT)
Dept: OBSTETRICS AND GYNECOLOGY | Facility: CLINIC | Age: 39
End: 2019-01-20

## 2019-01-20 NOTE — TELEPHONE ENCOUNTER
Called emergency line-   Started to feel urinary frequency and dysuria today.  Went to urgent care- diagnosed with UTI.   Started on Keflex.  Then noticed gideon hematuria.      Denies fevers/ N/ V/ back pain.  Discussed pyelonephritis precautions.    Will call the office tomorrow if no improvement in symptoms.

## 2019-02-27 ENCOUNTER — TELEPHONE (OUTPATIENT)
Dept: OBSTETRICS AND GYNECOLOGY | Facility: CLINIC | Age: 39
End: 2019-02-27

## 2019-02-27 PROCEDURE — 87624 HPV HI-RISK TYP POOLED RSLT: CPT | Performed by: OBSTETRICS & GYNECOLOGY

## 2019-02-27 PROCEDURE — G0145 SCR C/V CYTO,THINLAYER,RESCR: HCPCS | Performed by: OBSTETRICS & GYNECOLOGY

## 2019-02-27 NOTE — TELEPHONE ENCOUNTER
Patient called and wants to know how can she get her 2018 expenses for tax purposes from this office.  Please call.  Thank you

## 2019-03-07 ENCOUNTER — OFFICE VISIT (OUTPATIENT)
Dept: OBSTETRICS AND GYNECOLOGY | Facility: CLINIC | Age: 39
End: 2019-03-07
Payer: COMMERCIAL

## 2019-03-07 VITALS
HEIGHT: 67 IN | BODY MASS INDEX: 17.74 KG/M2 | DIASTOLIC BLOOD PRESSURE: 80 MMHG | SYSTOLIC BLOOD PRESSURE: 100 MMHG | WEIGHT: 113 LBS

## 2019-03-07 DIAGNOSIS — Z12.4 ROUTINE CERVICAL SMEAR: ICD-10-CM

## 2019-03-07 DIAGNOSIS — Z11.51 SPECIAL SCREENING EXAMINATION FOR HUMAN PAPILLOMAVIRUS (HPV): ICD-10-CM

## 2019-03-07 DIAGNOSIS — Z01.419 WOMEN'S ANNUAL ROUTINE GYNECOLOGICAL EXAMINATION: Primary | ICD-10-CM

## 2019-03-07 PROCEDURE — 99395 PREV VISIT EST AGE 18-39: CPT | Performed by: OBSTETRICS & GYNECOLOGY

## 2019-03-07 NOTE — PROGRESS NOTES
"3/7/2019  Tatum Correa is a 38 y.o. female who presents for annual exam.     HPI            Routine Exam / IVF with both kids     Review of Systems    Mood- good   Heart/Lung- no issues   Breast- no issues   Urine/GI- frequency   Menopause- no issues   Menstruation-       Current contraception:   Last Pap Test:  Hypo /Hypo does not ovulate, prior 2012 abnormal  CNI 1 HPV   Last Mammogram: to young   Last Dexascan: to young   Last Colonoscopy: to young   Last Routine Labs: PCP    Family History   Problem Relation Age of Onset   • Prostate cancer Father        Family history of:  Father Prostate Cancer     Family history of heart disease:  None     Review of Systems and Physical Exam  The following have been reviewed and updated as appropriate in this visit:      /80 (BP Location: Right upper arm, Patient Position: Sitting)   Ht 1.702 m (5' 7\")   Wt 51.3 kg (113 lb)   BMI 17.70 kg/m²     OBGyn Exam    Neck supple   Breast non tender no masses  Uterus mid position no masses  Abdomen normal no masses  Thyroid normal      Assessment and Plan    yearly    Discussed endometrium cancer   Consider getting estrogen Lab work ( hormone labs) done ( after 3 months of breast feeding )  Suspect hypothropatic      No orders of the defined types were placed in this encounter.      Diagnoses and all orders for this visit:    Women's annual routine gynecological examination  -     Cytology, Thinprep Pap  -     THINPREP PAP ([facility])    Routine cervical smear  -     Cytology, Thinprep Pap  -     THINPREP PAP ([facility])    Special screening examination for human papillomavirus (HPV)  -     Cytology, Thinprep Pap  -     THINPREP PAP ([facility])    .  No Follow-up on file.  Usha Bush MA      I, Usha Bush MA, am scribing for, and in the presence of, Todd Nunez MD.    3/7/2019 1:19 PM  "

## 2019-03-13 ENCOUNTER — TELEPHONE (OUTPATIENT)
Dept: OBSTETRICS AND GYNECOLOGY | Facility: CLINIC | Age: 39
End: 2019-03-13

## 2019-03-13 RX ORDER — NITROFURANTOIN 25; 75 MG/1; MG/1
100 CAPSULE ORAL 2 TIMES DAILY
Qty: 14 CAPSULE | Refills: 0 | Status: SHIPPED | OUTPATIENT
Start: 2019-03-13 | End: 2019-03-20

## 2019-03-13 NOTE — TELEPHONE ENCOUNTER
Patient have a UTI, she asked for medication to be order at pharmacy. Please call patient to notify when medication is order.

## 2019-03-15 LAB
CASE RPRT: NORMAL
CLINICAL INFO: NORMAL
CLINICAL INFO: NORMAL
HPV HR 12 DNA CVX QL NAA+PROBE: NEGATIVE
HPV16 DNA SPEC QL NAA+PROBE: NEGATIVE
HPV18 DNA SPEC QL NAA+PROBE: NEGATIVE
LMP START DATE: NORMAL
SPECIMEN PROCESSING COMMENT: NORMAL
THIN PREP CVX: NORMAL

## 2019-06-26 ENCOUNTER — TELEPHONE (OUTPATIENT)
Dept: OBSTETRICS AND GYNECOLOGY | Facility: CLINIC | Age: 39
End: 2019-06-26

## 2019-06-26 NOTE — TELEPHONE ENCOUNTER
Patient called and said that she needs a script for routine mammogram.  Please fax to Jaki Fox at [phone].  Thank you

## 2019-06-27 NOTE — TELEPHONE ENCOUNTER
Patient stated d/c breastfeeding in April. Stated that she was told that once completed to let you know for possible lab work and or for possible options since she does not get regular cycles.   Cell [phone]

## 2019-06-28 DIAGNOSIS — E34.9 HORMONAL DISORDER: Primary | ICD-10-CM

## 2019-07-01 ENCOUNTER — TRANSCRIBE ORDERS (OUTPATIENT)
Dept: OBSTETRICS AND GYNECOLOGY | Facility: CLINIC | Age: 39
End: 2019-07-01

## 2019-07-01 DIAGNOSIS — Z12.31 SCREENING MAMMOGRAM, ENCOUNTER FOR: Primary | ICD-10-CM

## 2019-07-01 NOTE — TELEPHONE ENCOUNTER
Phone call to patient. Patient request for screening mammogram. Pt understands screening begins at age 40. Pt will check with insurance for coverage. KYA cool

## 2019-07-02 LAB
ESTRADIOL SERPL-MCNC: 11.1 PG/ML
FSH SERPL-ACNC: 8.2 MIU/ML
PROGEST SERPL-MCNC: 0.1 NG/ML
PROLACTIN SERPL-MCNC: 7.1 NG/ML (ref 4.8–23.3)
T4 FREE SERPL-MCNC: 1.13 NG/DL (ref 0.82–1.77)
TSH SERPL DL<=0.005 MIU/L-ACNC: 1.19 UIU/ML (ref 0.45–4.5)

## 2019-07-09 ENCOUNTER — TRANSCRIBE ORDERS (OUTPATIENT)
Dept: OBSTETRICS AND GYNECOLOGY | Facility: CLINIC | Age: 39
End: 2019-07-09

## 2019-07-09 DIAGNOSIS — M81.0 AGE-RELATED OSTEOPOROSIS WITHOUT CURRENT PATHOLOGICAL FRACTURE: Primary | ICD-10-CM

## 2019-07-09 RX ORDER — ESTRADIOL 1 MG/G
GEL TOPICAL DAILY
Qty: 30 G | Refills: 6 | Status: SHIPPED | OUTPATIENT
Start: 2019-07-09

## 2019-07-09 RX ORDER — PROGESTERONE 200 MG/1
200 CAPSULE ORAL DAILY
Qty: 90 CAPSULE | Refills: 1 | Status: SHIPPED | OUTPATIENT
Start: 2019-07-09 | End: 2020-07-08

## 2019-07-09 NOTE — TELEPHONE ENCOUNTER
----- Message from Todd Nunez MD sent at 7/9/2019 10:49 AM EDT -----  Carlotta can you put Tatum in for a DEXA scan and give her instructions on how to obtain also can you order Divigel 1 mg along with Prometrium 200 mg

## 2019-07-09 NOTE — TELEPHONE ENCOUNTER
Tatum is had a long-standing history for amenorrhea most recent blood samples confirm hypothalamic hypogonadotrophic is him she has a deficiency of estrogen with a low FSH normal prolactin Tatum and I have discussed the concept of being without hormone at such a young age I do not feel that she needs to be in the birth control pill however we can offer her Divigel 1 mg along with Prometrium 200 to be taken at night I do want her to get a DEXA scan to assess for osteoporosis she will recontact with any concerns

## 2019-07-10 RX ORDER — ESTRADIOL 0.1 MG/D
1 FILM, EXTENDED RELEASE TRANSDERMAL 2 TIMES WEEKLY
Qty: 24 PATCH | Refills: 3 | Status: SHIPPED | OUTPATIENT
Start: 2019-07-11 | End: 2020-07-10

## 2019-07-10 NOTE — TELEPHONE ENCOUNTER
RX sent for Prometrium 200mg & Minivelle patch twice weekly into [facility] pharm.   Pt aware.   Patient given [facility] scheduling contact # to schedule Dexa scan. KYA cool

## 2019-07-18 ENCOUNTER — HOSPITAL ENCOUNTER (OUTPATIENT)
Dept: RADIOLOGY | Age: 39
Discharge: HOME | End: 2019-07-18
Attending: OBSTETRICS & GYNECOLOGY
Payer: COMMERCIAL

## 2019-07-18 DIAGNOSIS — M81.0 AGE-RELATED OSTEOPOROSIS WITHOUT CURRENT PATHOLOGICAL FRACTURE: ICD-10-CM

## 2019-07-18 PROCEDURE — 77080 DXA BONE DENSITY AXIAL: CPT

## 2019-07-23 ENCOUNTER — TELEPHONE (OUTPATIENT)
Dept: OBSTETRICS AND GYNECOLOGY | Facility: CLINIC | Age: 39
End: 2019-07-23

## 2019-07-24 NOTE — TELEPHONE ENCOUNTER
Reviewed this patient's DEXA scan -2.5 in the spine -1.5 in the hips this is her first study being done for hypo-estrogen density    She is presently on Divigel along with Prometrium we did discuss the fact that medications are rarely used premenopausally I prefer that she take calcium 500 a day with vitamin D 3000 a day along with that consider overhead weightbearing exercise along with Divigel we should redo the study in 2 years to see what improvement she has made

## 2019-08-19 ENCOUNTER — TELEPHONE (OUTPATIENT)
Dept: OBSTETRICS AND GYNECOLOGY | Facility: CLINIC | Age: 39
End: 2019-08-19

## 2019-10-04 ENCOUNTER — TELEPHONE (OUTPATIENT)
Dept: OBSTETRICS AND GYNECOLOGY | Facility: CLINIC | Age: 39
End: 2019-10-04

## 2019-10-04 RX ORDER — SULFAMETHOXAZOLE AND TRIMETHOPRIM 800; 160 MG/1; MG/1
1 TABLET ORAL 2 TIMES DAILY
Qty: 6 TABLET | Refills: 0 | Status: SHIPPED | OUTPATIENT
Start: 2019-10-04 | End: 2019-10-07

## 2020-02-12 ENCOUNTER — TELEPHONE (OUTPATIENT)
Dept: OBSTETRICS AND GYNECOLOGY | Facility: CLINIC | Age: 40
End: 2020-02-12

## 2020-07-01 ENCOUNTER — TRANSCRIBE ORDERS (OUTPATIENT)
Dept: SCHEDULING | Age: 40
End: 2020-07-01

## 2020-07-01 DIAGNOSIS — Z12.31 ENCOUNTER FOR SCREENING MAMMOGRAM FOR MALIGNANT NEOPLASM OF BREAST: Primary | ICD-10-CM

## 2020-08-03 ENCOUNTER — HOSPITAL ENCOUNTER (OUTPATIENT)
Dept: RADIOLOGY | Age: 40
Discharge: HOME | End: 2020-08-03
Attending: OBSTETRICS & GYNECOLOGY
Payer: COMMERCIAL

## 2020-08-03 DIAGNOSIS — Z12.31 ENCOUNTER FOR SCREENING MAMMOGRAM FOR MALIGNANT NEOPLASM OF BREAST: ICD-10-CM

## 2020-08-03 PROCEDURE — 77067 SCR MAMMO BI INCL CAD: CPT

## 2021-03-31 DIAGNOSIS — Z23 ENCOUNTER FOR IMMUNIZATION: ICD-10-CM

## 2021-07-13 ENCOUNTER — TRANSCRIBE ORDERS (OUTPATIENT)
Dept: SCHEDULING | Age: 41
End: 2021-07-13

## 2021-07-13 DIAGNOSIS — M81.0 AGE-RELATED OSTEOPOROSIS WITHOUT CURRENT PATHOLOGICAL FRACTURE: Primary | ICD-10-CM

## 2021-07-13 DIAGNOSIS — Z12.31 ENCOUNTER FOR SCREENING MAMMOGRAM FOR MALIGNANT NEOPLASM OF BREAST: Primary | ICD-10-CM

## 2021-09-07 ENCOUNTER — HOSPITAL ENCOUNTER (OUTPATIENT)
Dept: RADIOLOGY | Age: 41
Discharge: HOME | End: 2021-09-07
Attending: OBSTETRICS & GYNECOLOGY
Payer: COMMERCIAL

## 2021-09-07 DIAGNOSIS — Z12.31 ENCOUNTER FOR SCREENING MAMMOGRAM FOR MALIGNANT NEOPLASM OF BREAST: ICD-10-CM

## 2021-09-07 PROCEDURE — 77063 BREAST TOMOSYNTHESIS BI: CPT

## 2022-03-08 ENCOUNTER — HOSPITAL ENCOUNTER (OUTPATIENT)
Dept: RADIOLOGY | Age: 42
Discharge: HOME | End: 2022-03-08
Attending: INTERNAL MEDICINE
Payer: COMMERCIAL

## 2022-03-08 DIAGNOSIS — M81.0 AGE-RELATED OSTEOPOROSIS WITHOUT CURRENT PATHOLOGICAL FRACTURE: ICD-10-CM

## 2022-03-08 PROCEDURE — 77080 DXA BONE DENSITY AXIAL: CPT

## 2022-10-27 ENCOUNTER — HOSPITAL ENCOUNTER (OUTPATIENT)
Dept: RADIOLOGY | Age: 42
Discharge: HOME | End: 2022-10-27
Attending: OBSTETRICS & GYNECOLOGY
Payer: COMMERCIAL

## 2022-10-27 ENCOUNTER — TRANSCRIBE ORDERS (OUTPATIENT)
Dept: RADIOLOGY | Age: 42
End: 2022-10-27

## 2022-10-27 DIAGNOSIS — Z12.31 ENCOUNTER FOR SCREENING MAMMOGRAM FOR MALIGNANT NEOPLASM OF BREAST: Primary | ICD-10-CM

## 2022-10-27 DIAGNOSIS — Z12.31 ENCOUNTER FOR SCREENING MAMMOGRAM FOR MALIGNANT NEOPLASM OF BREAST: ICD-10-CM

## 2022-10-27 PROCEDURE — 77067 SCR MAMMO BI INCL CAD: CPT

## 2022-10-27 PROCEDURE — 77063 BREAST TOMOSYNTHESIS BI: CPT

## 2023-10-10 ENCOUNTER — HOSPITAL ENCOUNTER (OUTPATIENT)
Dept: RADIOLOGY | Facility: CLINIC | Age: 43
Discharge: HOME | End: 2023-10-10
Attending: OBSTETRICS & GYNECOLOGY
Payer: COMMERCIAL

## 2023-10-10 ENCOUNTER — TRANSCRIBE ORDERS (OUTPATIENT)
Dept: RADIOLOGY | Facility: CLINIC | Age: 43
End: 2023-10-10

## 2023-10-10 DIAGNOSIS — Z12.31 ENCOUNTER FOR SCREENING MAMMOGRAM FOR MALIGNANT NEOPLASM OF BREAST: ICD-10-CM

## 2023-10-10 DIAGNOSIS — Z12.31 ENCOUNTER FOR SCREENING MAMMOGRAM FOR MALIGNANT NEOPLASM OF BREAST: Primary | ICD-10-CM

## 2023-10-10 PROCEDURE — 77067 SCR MAMMO BI INCL CAD: CPT

## 2024-10-14 ENCOUNTER — HOSPITAL ENCOUNTER (OUTPATIENT)
Dept: RADIOLOGY | Facility: CLINIC | Age: 44
Discharge: HOME | End: 2024-10-14
Attending: OBSTETRICS & GYNECOLOGY
Payer: COMMERCIAL

## 2024-10-14 ENCOUNTER — TRANSCRIBE ORDERS (OUTPATIENT)
Dept: RADIOLOGY | Facility: CLINIC | Age: 44
End: 2024-10-14

## 2024-10-14 DIAGNOSIS — Z12.31 ENCOUNTER FOR SCREENING MAMMOGRAM FOR MALIGNANT NEOPLASM OF BREAST: Primary | ICD-10-CM

## 2024-10-14 DIAGNOSIS — Z12.31 ENCOUNTER FOR SCREENING MAMMOGRAM FOR MALIGNANT NEOPLASM OF BREAST: ICD-10-CM

## 2024-10-14 PROCEDURE — 77063 BREAST TOMOSYNTHESIS BI: CPT
